# Patient Record
Sex: FEMALE | Race: WHITE | Employment: UNEMPLOYED | URBAN - METROPOLITAN AREA
[De-identification: names, ages, dates, MRNs, and addresses within clinical notes are randomized per-mention and may not be internally consistent; named-entity substitution may affect disease eponyms.]

---

## 2020-01-09 ENCOUNTER — APPOINTMENT (OUTPATIENT)
Dept: GENERAL RADIOLOGY | Age: 50
End: 2020-01-09
Attending: STUDENT IN AN ORGANIZED HEALTH CARE EDUCATION/TRAINING PROGRAM
Payer: MEDICARE

## 2020-01-09 ENCOUNTER — APPOINTMENT (OUTPATIENT)
Dept: CT IMAGING | Age: 50
End: 2020-01-09
Attending: STUDENT IN AN ORGANIZED HEALTH CARE EDUCATION/TRAINING PROGRAM
Payer: MEDICARE

## 2020-01-09 ENCOUNTER — HOSPITAL ENCOUNTER (EMERGENCY)
Age: 50
Discharge: HOME | End: 2020-01-09
Attending: STUDENT IN AN ORGANIZED HEALTH CARE EDUCATION/TRAINING PROGRAM
Payer: MEDICARE

## 2020-01-09 VITALS
HEART RATE: 80 BPM | OXYGEN SATURATION: 99 % | TEMPERATURE: 98 F | WEIGHT: 100.38 LBS | HEIGHT: 63.78 IN | SYSTOLIC BLOOD PRESSURE: 108 MMHG | BODY MASS INDEX: 17.35 KG/M2 | RESPIRATION RATE: 16 BRPM | DIASTOLIC BLOOD PRESSURE: 78 MMHG

## 2020-01-09 DIAGNOSIS — J44.1 COPD EXACERBATION (HCC): ICD-10-CM

## 2020-01-09 DIAGNOSIS — R91.8 LUNG NODULES: ICD-10-CM

## 2020-01-09 DIAGNOSIS — D64.9 ANEMIA, UNSPECIFIED TYPE: ICD-10-CM

## 2020-01-09 DIAGNOSIS — M79.602 PAIN OF LEFT UPPER EXTREMITY: Primary | ICD-10-CM

## 2020-01-09 DIAGNOSIS — Z87.19 H/O GASTROESOPHAGEAL REFLUX (GERD): ICD-10-CM

## 2020-01-09 DIAGNOSIS — J40 BRONCHITIS: ICD-10-CM

## 2020-01-09 LAB
ALBUMIN SERPL-MCNC: 3 G/DL (ref 3.4–5)
ALBUMIN/GLOB SERPL: 0.8 {RATIO} (ref 1–2)
ALP LIVER SERPL-CCNC: 77 U/L (ref 39–100)
ALT SERPL-CCNC: 11 U/L (ref 13–56)
ANION GAP SERPL CALC-SCNC: 7 MMOL/L (ref 0–18)
AST SERPL-CCNC: 22 U/L (ref 15–37)
ATRIAL RATE: 93 BPM
BASOPHILS # BLD AUTO: 0.06 X10(3) UL (ref 0–0.2)
BASOPHILS NFR BLD AUTO: 0.5 %
BILIRUB SERPL-MCNC: 0.3 MG/DL (ref 0.1–2)
BUN BLD-MCNC: 13 MG/DL (ref 7–18)
BUN/CREAT SERPL: 19.4 (ref 10–20)
CALCIUM BLD-MCNC: 8 MG/DL (ref 8.5–10.1)
CHLORIDE SERPL-SCNC: 111 MMOL/L (ref 98–112)
CO2 SERPL-SCNC: 27 MMOL/L (ref 21–32)
CREAT BLD-MCNC: 0.67 MG/DL (ref 0.55–1.02)
D-DIMER: 0.52 UG/ML FEU (ref ?–0.5)
DEPRECATED RDW RBC AUTO: 53.5 FL (ref 35.1–46.3)
EOSINOPHIL # BLD AUTO: 0.33 X10(3) UL (ref 0–0.7)
EOSINOPHIL NFR BLD AUTO: 2.7 %
ERYTHROCYTE [DISTWIDTH] IN BLOOD BY AUTOMATED COUNT: 16.6 % (ref 11–15)
GLOBULIN PLAS-MCNC: 3.9 G/DL (ref 2.8–4.4)
GLUCOSE BLD-MCNC: 102 MG/DL (ref 70–99)
GLUCOSE BLD-MCNC: 61 MG/DL (ref 70–99)
HCT VFR BLD AUTO: 33.8 % (ref 35–48)
HGB BLD-MCNC: 10.3 G/DL (ref 12–16)
IMM GRANULOCYTES # BLD AUTO: 0.02 X10(3) UL (ref 0–1)
IMM GRANULOCYTES NFR BLD: 0.2 %
LYMPHOCYTES # BLD AUTO: 6.71 X10(3) UL (ref 1–4)
LYMPHOCYTES NFR BLD AUTO: 55.7 %
M PROTEIN MFR SERPL ELPH: 6.9 G/DL (ref 6.4–8.2)
MCH RBC QN AUTO: 27.1 PG (ref 26–34)
MCHC RBC AUTO-ENTMCNC: 30.5 G/DL (ref 31–37)
MCV RBC AUTO: 88.9 FL (ref 80–100)
MONOCYTES # BLD AUTO: 0.81 X10(3) UL (ref 0.1–1)
MONOCYTES NFR BLD AUTO: 6.7 %
NEUTROPHILS # BLD AUTO: 4.12 X10 (3) UL (ref 1.5–7.7)
NEUTROPHILS # BLD AUTO: 4.12 X10(3) UL (ref 1.5–7.7)
NEUTROPHILS NFR BLD AUTO: 34.2 %
OSMOLALITY SERPL CALC.SUM OF ELEC: 298 MOSM/KG (ref 275–295)
P AXIS: 60 DEGREES
P-R INTERVAL: 160 MS
PLATELET # BLD AUTO: 302 10(3)UL (ref 150–450)
POTASSIUM SERPL-SCNC: 4.6 MMOL/L (ref 3.5–5.1)
Q-T INTERVAL: 348 MS
QRS DURATION: 82 MS
QTC CALCULATION (BEZET): 432 MS
R AXIS: 44 DEGREES
RBC # BLD AUTO: 3.8 X10(6)UL (ref 3.8–5.3)
SODIUM SERPL-SCNC: 145 MMOL/L (ref 136–145)
T AXIS: 59 DEGREES
TROPONIN I SERPL-MCNC: <0.045 NG/ML (ref ?–0.04)
VENTRICULAR RATE: 93 BPM
WBC # BLD AUTO: 12.1 X10(3) UL (ref 4–11)

## 2020-01-09 PROCEDURE — 71045 X-RAY EXAM CHEST 1 VIEW: CPT | Performed by: STUDENT IN AN ORGANIZED HEALTH CARE EDUCATION/TRAINING PROGRAM

## 2020-01-09 PROCEDURE — 85379 FIBRIN DEGRADATION QUANT: CPT | Performed by: STUDENT IN AN ORGANIZED HEALTH CARE EDUCATION/TRAINING PROGRAM

## 2020-01-09 PROCEDURE — 99285 EMERGENCY DEPT VISIT HI MDM: CPT

## 2020-01-09 PROCEDURE — 80053 COMPREHEN METABOLIC PANEL: CPT | Performed by: STUDENT IN AN ORGANIZED HEALTH CARE EDUCATION/TRAINING PROGRAM

## 2020-01-09 PROCEDURE — 93010 ELECTROCARDIOGRAM REPORT: CPT

## 2020-01-09 PROCEDURE — 96361 HYDRATE IV INFUSION ADD-ON: CPT

## 2020-01-09 PROCEDURE — 96360 HYDRATION IV INFUSION INIT: CPT

## 2020-01-09 PROCEDURE — 82962 GLUCOSE BLOOD TEST: CPT

## 2020-01-09 PROCEDURE — 94640 AIRWAY INHALATION TREATMENT: CPT

## 2020-01-09 PROCEDURE — 93005 ELECTROCARDIOGRAM TRACING: CPT

## 2020-01-09 PROCEDURE — 71275 CT ANGIOGRAPHY CHEST: CPT | Performed by: STUDENT IN AN ORGANIZED HEALTH CARE EDUCATION/TRAINING PROGRAM

## 2020-01-09 PROCEDURE — 85025 COMPLETE CBC W/AUTO DIFF WBC: CPT | Performed by: STUDENT IN AN ORGANIZED HEALTH CARE EDUCATION/TRAINING PROGRAM

## 2020-01-09 PROCEDURE — 84484 ASSAY OF TROPONIN QUANT: CPT | Performed by: STUDENT IN AN ORGANIZED HEALTH CARE EDUCATION/TRAINING PROGRAM

## 2020-01-09 RX ORDER — IPRATROPIUM BROMIDE AND ALBUTEROL SULFATE 2.5; .5 MG/3ML; MG/3ML
3 SOLUTION RESPIRATORY (INHALATION) ONCE
Status: COMPLETED | OUTPATIENT
Start: 2020-01-09 | End: 2020-01-09

## 2020-01-09 RX ORDER — MAGNESIUM HYDROXIDE/ALUMINUM HYDROXICE/SIMETHICONE 120; 1200; 1200 MG/30ML; MG/30ML; MG/30ML
30 SUSPENSION ORAL ONCE
Status: COMPLETED | OUTPATIENT
Start: 2020-01-09 | End: 2020-01-09

## 2020-01-09 RX ORDER — ALBUTEROL SULFATE 90 UG/1
2 AEROSOL, METERED RESPIRATORY (INHALATION) EVERY 4 HOURS PRN
Qty: 1 INHALER | Refills: 0 | Status: SHIPPED | OUTPATIENT
Start: 2020-01-09 | End: 2020-02-08

## 2020-01-09 RX ORDER — SODIUM CHLORIDE 9 MG/ML
INJECTION, SOLUTION INTRAVENOUS CONTINUOUS
Status: DISCONTINUED | OUTPATIENT
Start: 2020-01-09 | End: 2020-01-09

## 2020-01-09 RX ORDER — AZITHROMYCIN 250 MG/1
TABLET, FILM COATED ORAL
Qty: 1 PACKAGE | Refills: 0 | Status: SHIPPED | OUTPATIENT
Start: 2020-01-09 | End: 2020-01-14

## 2020-01-09 RX ORDER — ALBUTEROL SULFATE 2.5 MG/3ML
2.5 SOLUTION RESPIRATORY (INHALATION) EVERY 4 HOURS PRN
Qty: 30 AMPULE | Refills: 0 | Status: SHIPPED | OUTPATIENT
Start: 2020-01-09 | End: 2020-02-08

## 2020-01-09 RX ORDER — LIDOCAINE HYDROCHLORIDE 20 MG/ML
10 SOLUTION OROPHARYNGEAL ONCE
Status: COMPLETED | OUTPATIENT
Start: 2020-01-09 | End: 2020-01-09

## 2020-01-09 RX ORDER — ASPIRIN 81 MG/1
324 TABLET, CHEWABLE ORAL ONCE
Status: COMPLETED | OUTPATIENT
Start: 2020-01-09 | End: 2020-01-09

## 2020-01-09 RX ORDER — PREDNISONE 20 MG/1
40 TABLET ORAL DAILY
Qty: 10 TABLET | Refills: 0 | Status: SHIPPED | OUTPATIENT
Start: 2020-01-09 | End: 2020-01-14

## 2020-01-09 NOTE — ED PROVIDER NOTES
Patient Seen in: THE [facility] Emergency Department In [place]      History   Patient presents with:  Dyspnea BLAISE SOB    Stated Complaint: Left arm pain, difficulty breathing.     HPI    Patient is a 16-year-old female who reports previous history of lung dis 97.6 °F (36.4 °C) (Oral)   Resp 16   Ht 162 cm (5' 3.78\")   Wt 45.5 kg   SpO2 99%   BMI 17.35 kg/m²         Physical Exam    Constitutional: oriented to person, place, and time, appears frail and very thin. HENT:   Head: Normocephalic and atraumatic.    E females exclusively to validate the 95% negative predictive value  for venous thromboembolism when the D-Dimer is greater than 0.50 ug/mL (FEU). Proceed with caution from clinical presentation.      RBC MORPHOLOGY SCAN - Abnormal; Notable for the follow mediastinal pleura was noted. It is unclear whether this is related to previous history of patient's lymphoma versus current disease this is also associated with bilateral lower lobe pulmonary nodules.         MDM     Extensive differential diagnosis was c nodules    Disposition:  Discharge  1/9/2020  5:38 am    Follow-up:  Susu Oneal  [address] [facility] [place]  [address] [phone]    Schedule an appointment as soon as possible for a visit      your pcp    Today          Med

## 2021-04-02 PROBLEM — Z85.71 HISTORY OF HODGKIN'S DISEASE: Status: ACTIVE | Noted: 2021-04-02

## 2021-04-02 PROBLEM — Z85.72 HISTORY OF NON-HODGKIN'S LYMPHOMA: Status: ACTIVE | Noted: 2021-04-02

## 2021-05-20 ENCOUNTER — OFFICE VISIT (OUTPATIENT)
Dept: FAMILY MEDICINE CLINIC | Facility: CLINIC | Age: 51
End: 2021-05-20
Payer: MEDICAID

## 2021-05-20 VITALS
RESPIRATION RATE: 16 BRPM | HEART RATE: 88 BPM | HEIGHT: 61 IN | BODY MASS INDEX: 18.5 KG/M2 | TEMPERATURE: 98 F | DIASTOLIC BLOOD PRESSURE: 68 MMHG | WEIGHT: 98 LBS | SYSTOLIC BLOOD PRESSURE: 132 MMHG | OXYGEN SATURATION: 99 %

## 2021-05-20 DIAGNOSIS — Z00.00 WELL ADULT EXAM: Primary | ICD-10-CM

## 2021-05-20 DIAGNOSIS — Z12.31 VISIT FOR SCREENING MAMMOGRAM: ICD-10-CM

## 2021-05-20 DIAGNOSIS — F33.1 MODERATE EPISODE OF RECURRENT MAJOR DEPRESSIVE DISORDER (HCC): ICD-10-CM

## 2021-05-20 DIAGNOSIS — Z85.71 HISTORY OF HODGKIN'S DISEASE: ICD-10-CM

## 2021-05-20 DIAGNOSIS — E89.0 POSTOPERATIVE HYPOTHYROIDISM: ICD-10-CM

## 2021-05-20 DIAGNOSIS — F41.9 ANXIETY: ICD-10-CM

## 2021-05-20 DIAGNOSIS — E03.9 HYPOTHYROIDISM, UNSPECIFIED TYPE: ICD-10-CM

## 2021-05-20 DIAGNOSIS — J44.9 ASTHMA-COPD OVERLAP SYNDROME (HCC): ICD-10-CM

## 2021-05-20 DIAGNOSIS — G43.809 OTHER MIGRAINE WITHOUT STATUS MIGRAINOSUS, NOT INTRACTABLE: ICD-10-CM

## 2021-05-20 DIAGNOSIS — Z12.11 COLON CANCER SCREENING: ICD-10-CM

## 2021-05-20 DIAGNOSIS — G89.4 CHRONIC PAIN SYNDROME: ICD-10-CM

## 2021-05-20 DIAGNOSIS — Z90.81 H/O SPLENECTOMY: ICD-10-CM

## 2021-05-20 DIAGNOSIS — J44.9 CHRONIC OBSTRUCTIVE PULMONARY DISEASE, UNSPECIFIED COPD TYPE (HCC): ICD-10-CM

## 2021-05-20 DIAGNOSIS — M79.7 FIBROMYALGIA: ICD-10-CM

## 2021-05-20 DIAGNOSIS — C83.30 DIFFUSE LARGE B-CELL LYMPHOMA, UNSPECIFIED BODY REGION (HCC): ICD-10-CM

## 2021-05-20 DIAGNOSIS — K21.9 GASTROESOPHAGEAL REFLUX DISEASE WITHOUT ESOPHAGITIS: ICD-10-CM

## 2021-05-20 PROBLEM — F32.A DEPRESSION: Status: ACTIVE | Noted: 2018-05-21

## 2021-05-20 PROBLEM — G43.909 MIGRAINE: Status: ACTIVE | Noted: 2021-05-20

## 2021-05-20 PROBLEM — J18.9 PNEUMONIA OF RIGHT LOWER LOBE DUE TO INFECTIOUS ORGANISM: Status: ACTIVE | Noted: 2018-05-24

## 2021-05-20 PROCEDURE — 99386 PREV VISIT NEW AGE 40-64: CPT | Performed by: FAMILY MEDICINE

## 2021-05-20 PROCEDURE — 3078F DIAST BP <80 MM HG: CPT | Performed by: FAMILY MEDICINE

## 2021-05-20 PROCEDURE — 3075F SYST BP GE 130 - 139MM HG: CPT | Performed by: FAMILY MEDICINE

## 2021-05-20 PROCEDURE — 3008F BODY MASS INDEX DOCD: CPT | Performed by: FAMILY MEDICINE

## 2021-05-20 RX ORDER — MOMETASONE FUROATE AND FORMOTEROL FUMARATE DIHYDRATE 200; 5 UG/1; UG/1
1 AEROSOL RESPIRATORY (INHALATION) 2 TIMES DAILY
Qty: 1 INHALER | Refills: 5 | Status: SHIPPED | OUTPATIENT
Start: 2021-05-20

## 2021-05-20 RX ORDER — BUTALBITAL, ACETAMINOPHEN AND CAFFEINE 300; 40; 50 MG/1; MG/1; MG/1
1 CAPSULE ORAL 2 TIMES DAILY PRN
COMMUNITY
Start: 2021-04-28 | End: 2021-05-20

## 2021-05-20 RX ORDER — TIOTROPIUM BROMIDE 18 UG/1
18 CAPSULE ORAL; RESPIRATORY (INHALATION) DAILY
Qty: 30 CAPSULE | Refills: 11 | Status: SHIPPED | OUTPATIENT
Start: 2021-05-20

## 2021-05-20 RX ORDER — ONDANSETRON 8 MG/1
TABLET, ORALLY DISINTEGRATING ORAL
COMMUNITY
Start: 2021-05-18 | End: 2021-05-20

## 2021-05-20 RX ORDER — OXYCODONE HYDROCHLORIDE 30 MG/1
30 TABLET ORAL EVERY 6 HOURS PRN
COMMUNITY
Start: 2021-01-18 | End: 2021-05-20

## 2021-05-20 RX ORDER — SUMATRIPTAN 100 MG/1
100 TABLET, FILM COATED ORAL EVERY 2 HOUR PRN
Qty: 60 TABLET | Refills: 0 | Status: SHIPPED | OUTPATIENT
Start: 2021-05-20

## 2021-05-20 RX ORDER — LEVOTHYROXINE SODIUM 88 UG/1
88 TABLET ORAL EVERY MORNING
Qty: 90 TABLET | Refills: 1 | Status: SHIPPED | OUTPATIENT
Start: 2021-05-20 | End: 2021-06-08

## 2021-05-20 RX ORDER — ALPRAZOLAM 1 MG/1
1 TABLET ORAL 2 TIMES DAILY PRN
Qty: 60 TABLET | Refills: 0 | Status: SHIPPED | OUTPATIENT
Start: 2021-05-20 | End: 2021-06-08

## 2021-05-20 RX ORDER — PANTOPRAZOLE SODIUM 40 MG/1
40 TABLET, DELAYED RELEASE ORAL
Qty: 90 TABLET | Refills: 0 | Status: SHIPPED | OUTPATIENT
Start: 2021-05-20

## 2021-05-20 RX ORDER — NALOXONE HYDROCHLORIDE 4 MG/.1ML
4 SPRAY, METERED NASAL AS NEEDED
Qty: 1 KIT | Refills: 0 | Status: SHIPPED | OUTPATIENT
Start: 2021-05-20

## 2021-05-20 RX ORDER — MONTELUKAST SODIUM 10 MG/1
10 TABLET ORAL NIGHTLY
Qty: 90 TABLET | Refills: 0 | Status: SHIPPED | OUTPATIENT
Start: 2021-05-20

## 2021-05-20 RX ORDER — ONDANSETRON 8 MG/1
8 TABLET, ORALLY DISINTEGRATING ORAL EVERY 8 HOURS PRN
Qty: 60 TABLET | Refills: 0 | Status: SHIPPED | OUTPATIENT
Start: 2021-05-20

## 2021-05-20 RX ORDER — ESCITALOPRAM OXALATE 20 MG/1
20 TABLET ORAL DAILY
Qty: 90 TABLET | Refills: 3 | Status: SHIPPED | OUTPATIENT
Start: 2021-05-20 | End: 2022-05-15

## 2021-05-20 RX ORDER — BUTALBITAL, ACETAMINOPHEN AND CAFFEINE 300; 40; 50 MG/1; MG/1; MG/1
1 CAPSULE ORAL 2 TIMES DAILY PRN
Qty: 84 CAPSULE | Refills: 0 | Status: SHIPPED | OUTPATIENT
Start: 2021-05-20 | End: 2021-07-13

## 2021-05-20 RX ORDER — OXYCODONE HYDROCHLORIDE 30 MG/1
30 TABLET ORAL EVERY 6 HOURS PRN
Qty: 60 TABLET | Refills: 0 | Status: SHIPPED | OUTPATIENT
Start: 2021-05-20 | End: 2021-06-08

## 2021-05-20 RX ORDER — ALBUTEROL SULFATE 90 UG/1
2 AEROSOL, METERED RESPIRATORY (INHALATION) EVERY 4 HOURS PRN
Qty: 1 INHALER | Refills: 3 | Status: SHIPPED | OUTPATIENT
Start: 2021-05-20 | End: 2022-01-03

## 2021-05-20 NOTE — PATIENT INSTRUCTIONS
Prevention Guidelines, Women Ages 36 to 52  Screening tests and vaccines are an important part of managing your health. A screening test is done to find diseases in people who don't have any symptoms.  The goal is to find a disease early so lifestyle rosa sigmoidoscopy every 5 years, or  · Colonoscopy every 10 years, or  · CT colonography (virtual colonoscopy) every 5 years, or  · Yearly fecal occult blood test, or  · Yearly fecal immunochemical test every year, or  · Stool DNA test, every 3 years  If you c least 4 weeks after the first dose   Hepatitis A Women at increased risk for infection–talk with your healthcare provider 2 doses given 6 months apart   Hepatitis B Women at increased risk for infection–talk with your healthcare provider 3 doses over 6 mon American Academy of Ophthalmology  Lakesha last reviewed this educational content on 11/1/2017  © 4013-8223 [employer]. All rights reserved. This information is not intended as a substitute for professional medical care.  Always follow your

## 2021-05-20 NOTE — PROGRESS NOTES
Patient presents with:  Establish Care: discuss lexapro for depression- she states she thinks medication isnt working anymore.       HPI:  52 y/o F with hx of Hodgkin's lymphoma at age 6, then DLBCL in 2006, fibromyalgia, hypothyroid, COPD on oxygen, presen done.  Her last Pap smear was 2 years ago and it was normal.        Review of Systems   Constitutional: Negative for fever, chills. No distress.   She does report fatigue  HENT: Negative for hearing loss, congestion, sore throat, neck pain and dental proble Smokeless tobacco: Never Used    Alcohol use: Never    Drug use: Never      Current Outpatient Medications   Medication Sig Dispense Refill   • escitalopram (LEXAPRO) 20 MG Oral Tab Take 1 tablet (20 mg total) by mouth daily.  90 tablet 3   • OxyCODONE HCl Immunizations: There is no immunization history on file for this patient.       Physical Exam  /68   Pulse 88   Temp 98.2 °F (36.8 °C) (Temporal)   Resp 16   Ht 5' 1\" (1.549 m)   Wt 98 lb (44.5 kg)   SpO2 99%   BMI 18.52 kg/m²   Constitutional: Or exercise, counseled on safe sex, std risks, vaccine and screening guidelines. - CBC WITH DIFFERENTIAL WITH PLATELET; Future  - COMP METABOLIC PANEL (14); Future  - LIPID PANEL; Future  - HEMOGLOBIN A1C; Future  - OP REFERRAL TO [facility]    2.  History of foods, alcohol, mints and fatty foods. Pt should avoid eating or drinking anything for at least four hours before bed. [facility] should be elevated four to six inches. - Pantoprazole Sodium 40 MG Oral Tab EC;  Take 1 tablet (40 mg total) by mouth every ([address]  - Mometasone Furo-Formoterol Fum (DULERA) 200-5 MCG/ACT Inhalation Aerosol; Inhale 1 puff into the lungs 2 (two) times a day. Dispense: 1 Inhaler; Refill: 5  - Montelukast Sodium 10 MG Oral Tab; Take 1 tablet (10 mg total) by mouth nightly.   Lou Esters Sulfate  (90 Base) MCG/ACT Inhalation Aero Soln 1 Inhaler 3     Sig: Inhale 2 puffs into the lungs every 4 (four) hours as needed.    • Butalbital-APAP-Caffeine -40 MG Oral Cap 84 capsule 0     Sig: Take 1 capsule by mouth 2 (two) times daily a more additional risk factors for diabetes At least every 3 years1   Type 2 diabetes or prediabetes All women diagnosed with gestational diabetes Lifelong testing every 3 years   Type 2 diabetes All women with prediabetes Every year   Alcohol misuse All wom Sexually active women at increased risk for infection At routine exams   Hepatitis C Anyone at increased risk; 1 time for those born between [facility] At routine exams   High cholesterol or triglycerides All women ages 39 and older who are at risk for pneumococcal polysaccharide vaccine (PPSV23) Women at increased risk for infection–talk with your healthcare provider 1 or 2 doses   Tetanus/diphtheria/pertussis (Td/Tdap) booster All women in this age group A 1-time dose of Tdap instead of a Td booster af

## 2021-05-21 ENCOUNTER — OFFICE VISIT (OUTPATIENT)
Dept: PAIN CLINIC | Facility: CLINIC | Age: 51
End: 2021-05-21
Payer: MEDICAID

## 2021-05-21 VITALS
HEART RATE: 95 BPM | SYSTOLIC BLOOD PRESSURE: 132 MMHG | WEIGHT: 98 LBS | BODY MASS INDEX: 19 KG/M2 | OXYGEN SATURATION: 90 % | DIASTOLIC BLOOD PRESSURE: 66 MMHG

## 2021-05-21 DIAGNOSIS — R52 GENERALIZED PAIN: ICD-10-CM

## 2021-05-21 DIAGNOSIS — G89.4 CHRONIC PAIN SYNDROME: ICD-10-CM

## 2021-05-21 DIAGNOSIS — M79.7 FIBROMYALGIA: Primary | ICD-10-CM

## 2021-05-21 PROCEDURE — 3075F SYST BP GE 130 - 139MM HG: CPT | Performed by: NURSE PRACTITIONER

## 2021-05-21 PROCEDURE — 3078F DIAST BP <80 MM HG: CPT | Performed by: NURSE PRACTITIONER

## 2021-05-21 PROCEDURE — 99214 OFFICE O/P EST MOD 30 MIN: CPT | Performed by: NURSE PRACTITIONER

## 2021-05-21 NOTE — PROGRESS NOTES
Patient: Chano Felix  Medical Record Number: [id number]    Referring Physician:  April Joseph  PCP: same    Dear Dr. Carmen Dawson: Thank you very much for requesting this consultation.  I had the opportunity to evaluate and initiate care for your but has not had sleep study done.   Patient also uses xanax 1mg bid for anxiety/she states she uses this sparingly/she states that she is not sure why but recently she has had times where she gets very aggravated and overwhelmed with the amount of outside s Great-Grandfather         lung   • Breast Cancer Other       Social History    Socioeconomic History      Marital status: Unknown      Spouse name: Not on file      Number of children: Not on file      Years of education: Not on file      Highest education mouth every morning before breakfast. 90 tablet 0   • Naloxone HCl 4 MG/0.1ML Nasal Liquid 4 mg by Nasal route as needed. If patient remains unresponsive, repeat dose in other nostril 2-5 minutes after first dose.  1 kit 0   • famotidine 40 MG Oral Tab Take  08/13/2020    .0 01/09/2020         PHYSICAL EXAMIMATION   PHYSICAL EXAMINATION: Munir Rose is a 53year old  female who is observed sitting comfortably on a chair in the exam room alert and oriented times three.  She looks distal perfusion. Nails painted unable to assess for cyanosis  HEART: normal, regular, S1 and S2  LUNGS:CTA/diminished throughout  MUSCULOSKELETAL: Smooth, pain-free ROM to bilat hips, ankles, and knees.    Cspine:  + tenderness to bilat trapezius region  S engaged in the pain management process, or barriers prevent (insurance, transportation,  3)  Patient fully engaged in a spectrum of appropriate treatments but with inadequate response.    2 Psychological 1) Serious personality dysfunction or mental illness provider    MEDICAL DECISION MAKING:     Impression:   Patient seen today/not good candidate for long term opioids based on current diagnosis of fibromyalgia, however she has been on these for years now/and would require therapy and weaning.   Patient open

## 2021-05-21 NOTE — PROGRESS NOTES
HPI/Subjective:   Patient ID: Niki Quintana is a 53year old female.     HPI    History/Other:   Review of Systems  Current Outpatient Medications   Medication Sig Dispense Refill   • escitalopram (LEXAPRO) 20 MG Oral Tab Take 1 tablet (20 mg total) Comment:Breathing problems    Objective:   Physical Exam  Constitutional:              Assessment & Plan:   No diagnosis found. No orders of the defined types were placed in this encounter.       Meds This Visit:  Requested Prescriptions      No prescrip

## 2021-06-01 ENCOUNTER — MED REC SCAN ONLY (OUTPATIENT)
Dept: FAMILY MEDICINE CLINIC | Facility: CLINIC | Age: 51
End: 2021-06-01

## 2021-06-08 ENCOUNTER — TELEMEDICINE (OUTPATIENT)
Dept: FAMILY MEDICINE CLINIC | Facility: CLINIC | Age: 51
End: 2021-06-08

## 2021-06-08 DIAGNOSIS — M79.7 FIBROMYALGIA: ICD-10-CM

## 2021-06-08 DIAGNOSIS — F41.9 ANXIETY: ICD-10-CM

## 2021-06-08 DIAGNOSIS — E03.9 HYPOTHYROIDISM, UNSPECIFIED TYPE: ICD-10-CM

## 2021-06-08 DIAGNOSIS — J18.9 PNEUMONIA DUE TO INFECTIOUS ORGANISM, UNSPECIFIED LATERALITY, UNSPECIFIED PART OF LUNG: Primary | ICD-10-CM

## 2021-06-08 DIAGNOSIS — E89.0 POSTOPERATIVE HYPOTHYROIDISM: ICD-10-CM

## 2021-06-08 DIAGNOSIS — G89.4 CHRONIC PAIN SYNDROME: ICD-10-CM

## 2021-06-08 PROCEDURE — 99214 OFFICE O/P EST MOD 30 MIN: CPT | Performed by: FAMILY MEDICINE

## 2021-06-08 RX ORDER — LEVOTHYROXINE SODIUM 88 UG/1
88 TABLET ORAL EVERY MORNING
Qty: 90 TABLET | Refills: 1 | Status: SHIPPED | OUTPATIENT
Start: 2021-06-08

## 2021-06-08 RX ORDER — OXYCODONE HYDROCHLORIDE 30 MG/1
30 TABLET ORAL EVERY 6 HOURS PRN
Qty: 60 TABLET | Refills: 0 | Status: SHIPPED | OUTPATIENT
Start: 2021-06-08 | End: 2021-06-25

## 2021-06-08 RX ORDER — ALPRAZOLAM 1 MG/1
1 TABLET ORAL 2 TIMES DAILY PRN
Qty: 60 TABLET | Refills: 2 | Status: SHIPPED | OUTPATIENT
Start: 2021-06-08

## 2021-06-08 NOTE — PROGRESS NOTES
Subjective    This visit is conducted using Telemedicine with live, interactive video and audio. Chief Complaint:  Patient presents with:   Follow - Up        The patient confirmed knowledge of the limitations of the use of telemedicine were verbally c History   Problem Relation Age of Onset   • Cancer Maternal Great-Grandfather         lung   • Breast Cancer Other       Social History    Tobacco Use      Smoking status: Never Smoker      Smokeless tobacco: Never Used    Alcohol use: Never    Drug use: N MCG Oral Tab; Take 1 tablet (88 mcg total) by mouth every morning. Dispense: 90 tablet; Refill: 1    3. Anxiety  Discussed medication dosage, usage, side effects, and goals of treatment in detail.  - ALPRAZolam 1 MG Oral Tab;  Take 1 tablet (1 mg total) by Morena    Diagnostic rationale, follow up instructions, and strict precautions/indications for emergent direct evaluation were discussed with the patient.  The patient agrees with the plan, and understands to follow up with their primary care physician or o

## 2021-06-10 ENCOUNTER — MED REC SCAN ONLY (OUTPATIENT)
Dept: FAMILY MEDICINE CLINIC | Facility: CLINIC | Age: 51
End: 2021-06-10

## 2021-06-15 ENCOUNTER — TELEPHONE (OUTPATIENT)
Dept: FAMILY MEDICINE CLINIC | Facility: CLINIC | Age: 51
End: 2021-06-15

## 2021-06-15 NOTE — TELEPHONE ENCOUNTER
Pt was in [facility]. She would like to have a different kind of cough medication because the ones that she has been taking have not been working. Pt was discharged about 4-5 days ago.  Patient states that she has finished the prednisone, antibioti

## 2021-06-15 NOTE — TELEPHONE ENCOUNTER
Future Appointments   Date Time Provider [facility]   6/16/2021 10:30 AM Eliza Conway DO [facility] [address] [facility]   6/21/2021 11:20 AM Rosina Handy MD [facility]   6/22/2021  2:00 PM Sherita Graves, LCSW [facility] [place]

## 2021-06-16 ENCOUNTER — TELEMEDICINE (OUTPATIENT)
Dept: FAMILY MEDICINE CLINIC | Facility: CLINIC | Age: 51
End: 2021-06-16

## 2021-06-16 ENCOUNTER — TELEPHONE (OUTPATIENT)
Dept: FAMILY MEDICINE CLINIC | Facility: CLINIC | Age: 51
End: 2021-06-16

## 2021-06-16 DIAGNOSIS — R05.9 COUGH: Primary | ICD-10-CM

## 2021-06-16 PROCEDURE — 99213 OFFICE O/P EST LOW 20 MIN: CPT | Performed by: FAMILY MEDICINE

## 2021-06-16 RX ORDER — PROMETHAZINE HYDROCHLORIDE AND CODEINE PHOSPHATE 6.25; 1 MG/5ML; MG/5ML
5 SOLUTION ORAL 2 TIMES DAILY PRN
Qty: 118 ML | Refills: 0 | Status: SHIPPED | OUTPATIENT
Start: 2021-06-16 | End: 2021-06-26

## 2021-06-16 NOTE — TELEPHONE ENCOUNTER
Alprazolam 1mg BID needs a PA.     PA submitted through [facility], awaiting approval/denial.

## 2021-06-16 NOTE — PROGRESS NOTES
Subjective    This visit is conducted using Telemedicine with live, interactive video and audio.     Chief Complaint:  Patient presents with:  Cough        The patient confirmed knowledge of the limitations of the use of telemedicine were verbally confirm COVID-19 QUESTIONS - quick screening.    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 no  Pt has Risk for complications no  Documented fever:  no  Travel to high risk [place]

## 2021-06-17 NOTE — PATIENT INSTRUCTIONS
COPD: Chronic Coughing  When you have COPD, you may have a cough that lasts for 3 months or longer. It can last for 2 years in a row. This is called a chronic cough. A chronic cough with COPD is usually caused by irritation of the airways.    What is a ch harmful irritants you breathe in. This helps protect the airways. But too much mucus can block the airways. And that makes it harder to breathe. The body tries to remove this excess mucus by coughing it up.    Treatment for chronic cough  There is no cure f pneumonia. Ask your healthcare provider about the flu and pneumonia vaccines. Take steps to prevent colds and other lung infections. · Practice correct handwashing. Wash your hands often with soap and water.  Use hand  when you can’t wash your ha

## 2021-06-23 DIAGNOSIS — G89.4 CHRONIC PAIN SYNDROME: ICD-10-CM

## 2021-06-23 DIAGNOSIS — M79.7 FIBROMYALGIA: ICD-10-CM

## 2021-06-23 NOTE — TELEPHONE ENCOUNTER
Requesting Oxycodone 30mg  LOV: 6/16/21 VV  RTC: prn  Last Relevant Labs:   Filled: 6/8/21 #60 with 0 refills    Future Appointments   Date Time Provider Gina Trim[place]   8/2/2021 10:00 AM Sheryl Kayser F, [facility] [place]     Per [place] PM

## 2021-06-23 NOTE — TELEPHONE ENCOUNTER
Pt would like to have a prescription for her pain medication sent to local pharmacy. She was only given a 15 day supply.

## 2021-06-24 ENCOUNTER — TELEPHONE (OUTPATIENT)
Dept: FAMILY MEDICINE CLINIC | Facility: CLINIC | Age: 51
End: 2021-06-24

## 2021-06-24 ENCOUNTER — HOSPITAL ENCOUNTER (OUTPATIENT)
Dept: GENERAL RADIOLOGY | Facility: HOSPITAL | Age: 51
Discharge: HOME | End: 2021-06-24
Attending: FAMILY MEDICINE
Payer: MEDICAID

## 2021-06-24 DIAGNOSIS — Z85.71 HISTORY OF HODGKIN'S DISEASE: ICD-10-CM

## 2021-06-24 DIAGNOSIS — M79.7 FIBROMYALGIA: ICD-10-CM

## 2021-06-24 DIAGNOSIS — Z00.00 WELL ADULT EXAM: ICD-10-CM

## 2021-06-24 PROCEDURE — 80061 LIPID PANEL: CPT

## 2021-06-24 PROCEDURE — 83036 HEMOGLOBIN GLYCOSYLATED A1C: CPT

## 2021-06-24 PROCEDURE — 84443 ASSAY THYROID STIM HORMONE: CPT

## 2021-06-24 PROCEDURE — 36591 DRAW BLOOD OFF VENOUS DEVICE: CPT

## 2021-06-24 PROCEDURE — 80053 COMPREHEN METABOLIC PANEL: CPT

## 2021-06-24 PROCEDURE — 84439 ASSAY OF FREE THYROXINE: CPT

## 2021-06-24 PROCEDURE — 85025 COMPLETE CBC W/AUTO DIFF WBC: CPT

## 2021-06-24 PROCEDURE — 82306 VITAMIN D 25 HYDROXY: CPT

## 2021-06-24 NOTE — TELEPHONE ENCOUNTER
Spoke to pt, informed her that her messages have been received and forwarded to Dr. Marti Littlejohn. Advised pt that Dr. Marti Littlejohn is out of the office and should be back tomorrow.  Also reviewed refill policy with pt, advised refills can take up to 48 hrs for appr

## 2021-06-25 ENCOUNTER — TELEPHONE (OUTPATIENT)
Dept: FAMILY MEDICINE CLINIC | Facility: CLINIC | Age: 51
End: 2021-06-25

## 2021-06-25 DIAGNOSIS — G89.4 CHRONIC PAIN SYNDROME: ICD-10-CM

## 2021-06-25 DIAGNOSIS — M79.7 FIBROMYALGIA: ICD-10-CM

## 2021-06-25 RX ORDER — OXYCODONE HYDROCHLORIDE 30 MG/1
30 TABLET ORAL DAILY PRN
Qty: 60 TABLET | Refills: 0 | Status: SHIPPED | OUTPATIENT
Start: 2021-06-25 | End: 2021-07-10

## 2021-06-25 RX ORDER — OXYCODONE HYDROCHLORIDE 30 MG/1
30 TABLET ORAL EVERY 6 HOURS PRN
Qty: 60 TABLET | Refills: 0 | Status: SHIPPED | OUTPATIENT
Start: 2021-06-25 | End: 2021-07-10

## 2021-06-25 NOTE — TELEPHONE ENCOUNTER
- Pt is calling back to follow up on medication request.  Patient has been out of medication for two days and is in a lot of pain.     Patient will schedule an appointment next week after seeing the lung specialist.    Please advise Ph [phone]

## 2021-06-25 NOTE — TELEPHONE ENCOUNTER
Spoke with pt, informed new Rx sent to pharmacy with original dosing instructions. Pt verbalized understanding and agreement. All questions answered.

## 2021-06-25 NOTE — TELEPHONE ENCOUNTER
Patient states it's an emergency to get her Oxy, she states she has been leaving messages for 4 days, wants to talk to the doctor, they changed something with the medication, she's having a hard time getting it, wants a call back asap! Please advise.

## 2021-06-25 NOTE — TELEPHONE ENCOUNTER
Spoke to pt, states Rx for Oxycodone that was sent today was for 1 tablet daily as needed. Pt states she takes 4 tablets per day, the pharmacy won't dispense Rx because directions changed.  Prescriptions in the past have been 1 tablet every 6 hours as neede

## 2021-06-28 ENCOUNTER — TELEPHONE (OUTPATIENT)
Dept: PAIN CLINIC | Facility: CLINIC | Age: 51
End: 2021-06-28

## 2021-06-28 DIAGNOSIS — E55.9 VITAMIN D DEFICIENCY: Primary | ICD-10-CM

## 2021-06-28 NOTE — TELEPHONE ENCOUNTER
Contacted patient to discuss Vit D results of 15.7 with her.   She reports she was recently in hospital x 10 days for PNA/was dx with severe copd/on inhalers/has f/u with pulm    She will be prescribed 50,000 units vit d weekly x 6 weeks and then f/u for re

## 2021-07-10 ENCOUNTER — OFFICE VISIT (OUTPATIENT)
Dept: FAMILY MEDICINE CLINIC | Facility: CLINIC | Age: 51
End: 2021-07-10
Payer: MEDICAID

## 2021-07-10 VITALS
HEIGHT: 61 IN | RESPIRATION RATE: 16 BRPM | SYSTOLIC BLOOD PRESSURE: 136 MMHG | DIASTOLIC BLOOD PRESSURE: 80 MMHG | WEIGHT: 97 LBS | HEART RATE: 105 BPM | OXYGEN SATURATION: 99 % | TEMPERATURE: 98 F | BODY MASS INDEX: 18.31 KG/M2

## 2021-07-10 DIAGNOSIS — Z12.11 COLON CANCER SCREENING: ICD-10-CM

## 2021-07-10 DIAGNOSIS — F11.20 OPIOID DEPENDENCE WITH CURRENT USE (HCC): ICD-10-CM

## 2021-07-10 DIAGNOSIS — Z12.31 VISIT FOR SCREENING MAMMOGRAM: ICD-10-CM

## 2021-07-10 DIAGNOSIS — M79.7 FIBROMYALGIA: ICD-10-CM

## 2021-07-10 DIAGNOSIS — Z12.4 PAPANICOLAOU SMEAR: ICD-10-CM

## 2021-07-10 DIAGNOSIS — G89.4 CHRONIC PAIN SYNDROME: Primary | ICD-10-CM

## 2021-07-10 PROCEDURE — 99214 OFFICE O/P EST MOD 30 MIN: CPT | Performed by: FAMILY MEDICINE

## 2021-07-10 PROCEDURE — 3079F DIAST BP 80-89 MM HG: CPT | Performed by: FAMILY MEDICINE

## 2021-07-10 PROCEDURE — 3075F SYST BP GE 130 - 139MM HG: CPT | Performed by: FAMILY MEDICINE

## 2021-07-10 PROCEDURE — 3008F BODY MASS INDEX DOCD: CPT | Performed by: FAMILY MEDICINE

## 2021-07-10 RX ORDER — OXYCODONE HYDROCHLORIDE 15 MG/1
15 TABLET ORAL EVERY 6 HOURS PRN
Qty: 120 TABLET | Refills: 0 | Status: SHIPPED | OUTPATIENT
Start: 2021-07-10 | End: 2021-07-10

## 2021-07-10 RX ORDER — ONDANSETRON HYDROCHLORIDE 8 MG/1
8 TABLET, FILM COATED ORAL EVERY 12 HOURS PRN
Qty: 30 TABLET | Refills: 0 | Status: SHIPPED | OUTPATIENT
Start: 2021-07-10 | End: 2021-08-04

## 2021-07-10 RX ORDER — OXYCODONE HYDROCHLORIDE 30 MG/1
30 TABLET ORAL EVERY 6 HOURS PRN
Qty: 120 TABLET | Refills: 0 | Status: SHIPPED | OUTPATIENT
Start: 2021-07-10 | End: 2021-07-10

## 2021-07-10 NOTE — PROGRESS NOTES
Patient presents with:  Complete Form: patient brought form to complete so she can be released to drive  Medication Follow-Up: medication review      HPI:     54 y/o F with hx of Hodgkin's lymphoma at age 6, then DLBCL in 2006, fibromyalgia, hypothyroid, C that the 15 mg are not covering and only 30s are. When I spoke with the pharmacist she reports that oxycodone is not covered at all and patient has been using good Rx cards to obtain it. She has basically been paying out-of-pocket for it this whole time. HERNIA SURGERY     • OTHER SURGICAL HISTORY      ovaries removal   • REMOVAL GALLBLADDER     • REMOVAL SPLEEN, TOTAL     • THYROIDECTOMY       Family History   Problem Relation Age of Onset   • Cancer Maternal Great-Grandfather         lung   • Breast Canc Monohydrate (SPIRIVA HANDIHALER) 18 MCG Inhalation Cap Inhale 1 capsule (18 mcg total) into the lungs daily.  30 capsule 11   • Pantoprazole Sodium 40 MG Oral Tab EC Take 1 tablet (40 mg total) by mouth every morning before breakfast. 90 tablet 0   • Naloxo it will be covered at a higher dose and quantity. I personally do not feel comfortable prescribing her opioids anymore as I do think that she has opiate addiction. Refer to addiction specialist for this.   Also declined her clearance form for permission t breast cancer by 40% to 50%. Due to the importance of this annual life saving examination, we encourage you to call us and schedule an appointment at your earliest convenience.   We want to make it easy and convenient for you to have your mammogram.    Ovidio

## 2021-07-10 NOTE — PATIENT INSTRUCTIONS
According to our records, you are overdue for your annual mammography screening. Mammograms are the best method to detect breast cancer early when it is easier to treat and before it is big enough to feel or cause symptoms.     Getting an annual lisa
(0) independent

## 2021-07-11 DIAGNOSIS — G43.809 OTHER MIGRAINE WITHOUT STATUS MIGRAINOSUS, NOT INTRACTABLE: ICD-10-CM

## 2021-07-12 ENCOUNTER — TELEPHONE (OUTPATIENT)
Dept: FAMILY MEDICINE CLINIC | Facility: CLINIC | Age: 51
End: 2021-07-12

## 2021-07-12 DIAGNOSIS — G43.809 OTHER MIGRAINE WITHOUT STATUS MIGRAINOSUS, NOT INTRACTABLE: ICD-10-CM

## 2021-07-12 NOTE — TELEPHONE ENCOUNTER
Name from pharmacy: BUT/ACETAMINOPHEN CAFF -40 CP          Will file in chart as: BUTALBITAL-APAP-CAFFEINE -43 MG Oral Cap     Possible duplicate: [place] to review recent actions on this medication    Sig: TAKE 1 CAPSULE BY MOUTH TWICE DAILY AS

## 2021-07-13 RX ORDER — BUTALBITAL, ACETAMINOPHEN AND CAFFEINE 300; 40; 50 MG/1; MG/1; MG/1
CAPSULE ORAL
Qty: 84 CAPSULE | Refills: 0 | OUTPATIENT
Start: 2021-07-13

## 2021-07-13 RX ORDER — BUTALBITAL, ACETAMINOPHEN AND CAFFEINE 300; 40; 50 MG/1; MG/1; MG/1
CAPSULE ORAL
Qty: 84 CAPSULE | Refills: 0 | Status: SHIPPED | OUTPATIENT
Start: 2021-07-13

## 2021-07-15 ENCOUNTER — TELEPHONE (OUTPATIENT)
Dept: FAMILY MEDICINE CLINIC | Facility: CLINIC | Age: 51
End: 2021-07-15

## 2021-07-15 NOTE — TELEPHONE ENCOUNTER
Referred to addiction clinic and will not accept her insurance, pls advise        Can we find her addiction medicine in network.

## 2021-07-15 NOTE — TELEPHONE ENCOUNTER
I called and spoke to patient about the referral for addiction medicine. I explained that [facility] stated she would need to call the number on the back of her insurance card [phone]. They will work with her to find a doctor or clinic.   Patient raised her

## 2021-07-16 ENCOUNTER — LAB ENCOUNTER (OUTPATIENT)
Dept: LAB | Age: 51
End: 2021-07-16
Attending: NURSE PRACTITIONER
Payer: MEDICAID

## 2021-07-16 DIAGNOSIS — F11.90 CHRONIC, CONTINUOUS USE OF OPIOIDS: ICD-10-CM

## 2021-07-16 PROBLEM — R52 GENERALIZED PAIN: Status: ACTIVE | Noted: 2021-07-16

## 2021-07-16 PROBLEM — E55.9 VITAMIN D DEFICIENCY: Status: ACTIVE | Noted: 2021-07-16

## 2021-07-16 PROCEDURE — 80307 DRUG TEST PRSMV CHEM ANLYZR: CPT

## 2021-07-16 NOTE — PROGRESS NOTES
Patient presents in office today with reported pain in \"all through my whole body\"    Current pain level reported = 8/10    Last reported dose of Oxycodone yesterday, pt took 800mg ibuprofen this morning       Narcotic Contract renewal     Urine Drug scr

## 2021-07-16 NOTE — PROGRESS NOTES
HPI:   Gerard Durham presents for follow up/she was last seen in office for initial consultation on 5/21/21 for fibromyalgia, pain in skull base of neck/traps, low back, FA, bilat hands, down legs to the tops of her feet.   She had had this denis sinc  having so many parking tickets, and she had a hearing and they are asking for this form to be filled out stating that patient is medically fit to operate a motor vehicle.      Patient states that she Is taking her meds as prescribed, oycodone 15mg q Tab Take 1 tablet (1 mg total) by mouth 2 (two) times daily as needed. 60 tablet 2   • escitalopram (LEXAPRO) 20 MG Oral Tab Take 1 tablet (20 mg total) by mouth daily.  90 tablet 3   • Mometasone Furo-Formoterol Fum (DULERA) 200-5 MCG/ACT Inhalation Aeroso effect, normal concentration & attention span  Inspection:   Non antalgic  No gross motor/sensory deficits noted  Well coord movement  Slightly pressured speech  No s/s intox  Radiology/Lab Test Reviewed:   Labs in system reviewed vit D: 15.7  No new imagi determine medical stability to drive. At this point, I would not be able to fill the form out/due to liability and risk while prescribing these medications.      Plan:   >opioid agreement signed today  >uds today   >see in office next month to evaluate pr

## 2021-07-19 LAB
6-ACETYLMORHINE CUTOFF 20 NG/ML: NOT DETECTED
7-AMINOCLONAZEPAM 40 NG/ML: NOT DETECTED
A-OH-ALPRAZOLM CUTOFF 20 NG/ML: NOT DETECTED
ALPHA-OH-MIDAZOLAM (CUTOFF 20 NG/ML): NOT DETECTED
ALPRAZOLAM CUTOFF 40 NG/ML: NOT DETECTED
AMPHETAMINE CUTOFF 10 NG/ML: NOT DETECTED
BARBITURATES CUTOFF 200 NG/ML: PRESENT
BENZOYLECGONINE  150 NG/ML: NOT DETECTED
BUPRENORPHINE CUTOFF 5 NG/ML: NOT DETECTED
CARISOPRODOL CUTOFF 100 NG/ML: NOT DETECTED
CODINE CUTOFF 40 NG/ML: NOT DETECTED
COLNAZEPAM CUTOFF 20 NG/ML: NOT DETECTED
CREATININE,URINE: 170.1 MG/DL
DIAZEPAM CUTOFF 50 NG/ML: NOT DETECTED
ETHYL-GLUCURONIDE 500 NG/ML: NOT DETECTED
FENTANYL CUTOFF 2 NG/ML: NOT DETECTED
GABAPENTIN (CUTOFF 100 NG/ML): NOT DETECTED
HYDROCODONE CUTOFF 40 NG/ML: NOT DETECTED
HYDROMORPHONE CUTOFF 40 NG/ML: NOT DETECTED
LORAZEPAM CUTOFF 60 NG/ML: NOT DETECTED
MARIJUANA CUTOFF 20 NG/ML: NOT DETECTED
MDA CUTOFF 200 NG/ML: NOT DETECTED
MDEA EVE CUTOFF 200 NG/ML: NOT DETECTED
MDMA-ECSTASY CUTOFF 200 NG/ML: NOT DETECTED
MEPERIDINE MET CUTOFF 50 NG/ML: NOT DETECTED
METHADONE CUTOFF 150 NG/ML: NOT DETECTED
METHAMPHETMN CUTOFF 400 NG/ML: NOT DETECTED
METHYLPHENIDATE (CUTOFF 100 NG/ML): NOT DETECTED
MIDAZOLAM CUTOFF 20 NG/ML: NOT DETECTED
MORHINE CUTOFF 20 NG/ML: NOT DETECTED
NALOXONE (CUTOFF 100 NG/ML): NOT DETECTED
NORBUPRENORPHINE  20 NG/ML: NOT DETECTED
NORDIAZEPAM CUTOFF 50 NG/ML: NOT DETECTED
NORFENTANYL CUTOFF 2 NG/ML: NOT DETECTED
NORHYDRCODONE CUTOFF 100 NG/ML: NOT DETECTED
NOROXYCODONE CUTOFF 100 NG/ML: PRESENT
NOROXYMORPHNE CUTOFF 100 NG/ML: PRESENT
OXAZEPAM CUTOFF 50 NG/ML: NOT DETECTED
OXYCODONE CUTOFF 40 NG/ML: PRESENT
OXYMORPHONE CUTOFF 40 NG/ML: PRESENT
PCP CUTOFF 25 NG/ML: NOT DETECTED
PHENTERMINE CUTOFF 100 NG/ML: NOT DETECTED
PREGABALIN (CUTOFF 100 NG/ML): NOT DETECTED
TAPENTADOL CUTOFF 100 NG/ML: NOT DETECTED
TAPENTADOL-O SULF 200 NG/ML: NOT DETECTED
TEMAZEPAM CUTOFF 50 NG/ML: NOT DETECTED
TRAMADOL CUTOFF 200 NG/ML: NOT DETECTED
ZOLPIDEM CUTOFF 20 NG/ML: NOT DETECTED
ZOLPIDEM METABOLITE (CUTOFF 100 NG/ML): NOT DETECTED

## 2021-07-20 ENCOUNTER — MED REC SCAN ONLY (OUTPATIENT)
Dept: PAIN CLINIC | Facility: CLINIC | Age: 51
End: 2021-07-20

## 2021-07-20 NOTE — TELEPHONE ENCOUNTER
Pt calling stating she met with Moni Villanueva on Friday 07/16/2021. Pt stated that there was a document left with [place]  regarding the pt's license and is looking for confirmation that the document has been completed.  Pt stated once the document is

## 2021-07-20 NOTE — TELEPHONE ENCOUNTER
NASRIN Willis  You 23 minutes ago (10:20 AM)     Patient was told that I was not going to fill out paperwork stating that she was medically stable to operate motor vehicle.  If she wants this to be done, she would have to go to [employer] and

## 2021-07-21 NOTE — TELEPHONE ENCOUNTER
D/w David Sotomayor RN who provided detailed information pertaining to [facility]. Noted that this was indicated in [facility] notes, therefore placed referral.     Per LOV note:  \"Also d/w patient that I was not comfortable filling out medi

## 2021-07-21 NOTE — TELEPHONE ENCOUNTER
Spoke with patient and explained [place] Adelso ALBARADO is not comfortable signing paperwork without a Drivers Evaluation . Patient started to raise her voice at this point and writer politely asked her to lower her voice.  Reiterated to patient again that VERENA will

## 2021-07-21 NOTE — TELEPHONE ENCOUNTER
Spoke to pt to relay info below. Pt states she was not informed of this info, she was told that [facility] would discuss the paperwork with another provider and get back to her.  Pt states she will do what needs to be done, but must have this done as immanuel

## 2021-07-21 NOTE — TELEPHONE ENCOUNTER
Pt calling to state that she called both [facility] locations, [facility] and [facility] regarding the [employer]. Pt stated that both facilities are not providing this service due to staffing issues, and the [facility] location told t

## 2021-07-21 NOTE — TELEPHONE ENCOUNTER
Frandy Jimenez called stating she followed up with the patient to schedule and but patient  refused to schedule due to the total cost of the program. Per Frandy Jimenez initial eval costs $300 and behind the wheel would be an additional $300.  Patient informed Frandy Jimenez

## 2021-07-21 NOTE — TELEPHONE ENCOUNTER
Researched OT  evaluation and found \"[facility] [place]. \" Contacted facility to ensure that they are active and providing this svc, which was confirmed. Inquired about what is needed to refer pt.  Informed that this facility is staffed by the O

## 2021-07-29 NOTE — TELEPHONE ENCOUNTER
Received paperwork addressed to [facility] from [facility] regarding the request for therapy services: occupational therapy drivers eval & treat. Document is asking for doc's signature. Placed in RN bin for review.

## 2021-07-29 NOTE — TELEPHONE ENCOUNTER
Per msg below, pt refused eval and treatment. Fax sent back to [facility] asking that they disregard order. Confirmation rcv'd.

## 2021-08-03 DIAGNOSIS — F11.20 OPIOID DEPENDENCE WITH CURRENT USE (HCC): ICD-10-CM

## 2021-08-04 NOTE — TELEPHONE ENCOUNTER
Requested Prescriptions     Pending Prescriptions Disp Refills   • ondansetron (ZOFRAN) 8 MG tablet [Pharmacy Med Name: ONDANSETRON 8MG TABLETS] 30 tablet 0     Sig: TAKE 1 TABLET(8 MG) BY MOUTH EVERY 12 HOURS AS NEEDED FOR NAUSEA     LOV: 7/10/21  RTC:

## 2021-08-06 RX ORDER — ONDANSETRON HYDROCHLORIDE 8 MG/1
TABLET, FILM COATED ORAL
Qty: 30 TABLET | Refills: 1 | Status: SHIPPED | OUTPATIENT
Start: 2021-08-06 | End: 2021-10-21

## 2021-08-14 DIAGNOSIS — J44.9 ASTHMA-COPD OVERLAP SYNDROME (HCC): ICD-10-CM

## 2021-08-14 DIAGNOSIS — J44.9 CHRONIC OBSTRUCTIVE PULMONARY DISEASE, UNSPECIFIED COPD TYPE (HCC): ICD-10-CM

## 2021-08-14 DIAGNOSIS — K21.9 GASTROESOPHAGEAL REFLUX DISEASE WITHOUT ESOPHAGITIS: ICD-10-CM

## 2021-08-16 NOTE — TELEPHONE ENCOUNTER
Name from pharmacy: PANTOPRAZOLE 40MG TABLETS          Will file in chart as: PANTOPRAZOLE 40 MG Oral Tab EC    Sig: TAKE 1 TABLET(40 MG) BY MOUTH EVERY MORNING BEFORE BREAKFAST    Disp:  90 tablet    Refills:  0 (Pharmacy requested: Not specified)    Star

## 2021-08-22 ENCOUNTER — HOSPITAL ENCOUNTER (EMERGENCY)
Age: 51
Discharge: HOME | End: 2021-08-22
Attending: EMERGENCY MEDICINE
Payer: MEDICAID

## 2021-08-22 VITALS
SYSTOLIC BLOOD PRESSURE: 114 MMHG | HEART RATE: 86 BPM | OXYGEN SATURATION: 100 % | HEIGHT: 61 IN | RESPIRATION RATE: 16 BRPM | BODY MASS INDEX: 18.88 KG/M2 | TEMPERATURE: 98 F | DIASTOLIC BLOOD PRESSURE: 69 MMHG | WEIGHT: 100 LBS

## 2021-08-22 DIAGNOSIS — Z20.822 EXPOSURE TO CONFIRMED CASE OF COVID-19: Primary | ICD-10-CM

## 2021-08-22 LAB — SARS-COV-2 RNA RESP QL NAA+PROBE: NOT DETECTED

## 2021-08-22 PROCEDURE — 99283 EMERGENCY DEPT VISIT LOW MDM: CPT | Performed by: EMERGENCY MEDICINE

## 2021-08-22 NOTE — ED PROVIDER NOTES
Patient Seen in: [facility] Emergency Department In [place]      History   Patient presents with:  Testing    Stated Complaint: son who lives with her tested positive for covid yesterday.   requesting to be t*    HPI/Subjective:   HPI    16-year-old woman Decreased air exchangeExtremities: no edema, normal peripheral pulses   Neuro: Alert oriented and nonfocal   Skin: no rashes or nodules         ED Course     Labs Reviewed   RAPID SARS-COV-2 BY PCR        MDM      Patient informed that she is a candidate f No

## 2021-08-24 LAB — AMB EXT COVID-19 RESULT: NOT DETECTED

## 2021-08-31 ENCOUNTER — HOSPITAL ENCOUNTER (EMERGENCY)
Age: 51
Discharge: HOME | End: 2021-08-31
Payer: MEDICAID

## 2021-08-31 ENCOUNTER — APPOINTMENT (OUTPATIENT)
Dept: ULTRASOUND IMAGING | Age: 51
End: 2021-08-31
Payer: MEDICAID

## 2021-08-31 VITALS
BODY MASS INDEX: 18.88 KG/M2 | WEIGHT: 100 LBS | HEIGHT: 61 IN | HEART RATE: 67 BPM | OXYGEN SATURATION: 100 % | TEMPERATURE: 98 F | SYSTOLIC BLOOD PRESSURE: 131 MMHG | RESPIRATION RATE: 14 BRPM | DIASTOLIC BLOOD PRESSURE: 63 MMHG

## 2021-08-31 DIAGNOSIS — M79.662 BILATERAL CALF PAIN: Primary | ICD-10-CM

## 2021-08-31 DIAGNOSIS — M79.661 BILATERAL CALF PAIN: Primary | ICD-10-CM

## 2021-08-31 PROCEDURE — 99284 EMERGENCY DEPT VISIT MOD MDM: CPT

## 2021-08-31 PROCEDURE — 93970 EXTREMITY STUDY: CPT

## 2021-08-31 RX ORDER — PANTOPRAZOLE SODIUM 40 MG/1
TABLET, DELAYED RELEASE ORAL
Qty: 90 TABLET | Refills: 0 | OUTPATIENT
Start: 2021-08-31

## 2021-08-31 RX ORDER — MONTELUKAST SODIUM 10 MG/1
TABLET ORAL
Qty: 90 TABLET | Refills: 0 | OUTPATIENT
Start: 2021-08-31

## 2021-08-31 NOTE — ED PROVIDER NOTES
Patient Seen in: THE [facility] Emergency Department In [place]      History   Patient presents with:  Swelling Edema    Stated Complaint: noticed right foot and calf swelling within the last couple hours denies injury    HPI/Subjective:   HPI    67-year-old Psychiatric/Behavioral: Negative for suicidal ideas. All other systems reviewed and are negative. Positive for stated complaint: noticed right foot and calf swelling within the last couple hours denies injury  Other systems are as noted in HPI.   C for what she thought was swelling of the legs, she was concerned about DVT as she had blood clots before years ago when she had cancer. Denied any injuries. Denied any heat/erythema. Patient felt her legs were swollen, this happened a couple hours ago.

## 2021-09-11 DIAGNOSIS — G43.809 OTHER MIGRAINE WITHOUT STATUS MIGRAINOSUS, NOT INTRACTABLE: ICD-10-CM

## 2021-09-13 RX ORDER — BUTALBITAL, ACETAMINOPHEN AND CAFFEINE 300; 40; 50 MG/1; MG/1; MG/1
CAPSULE ORAL
Qty: 84 CAPSULE | Refills: 0 | OUTPATIENT
Start: 2021-09-13

## 2021-09-13 NOTE — TELEPHONE ENCOUNTER
Requesting Butabital  LOV: 7/10/21  RTC: prn  Last Relevant Labs: 6/24/21  Filled: 7/13/21 #84 with 0 refills    No future appointments.     Pt has new PCP, Rx denied

## 2021-09-28 ENCOUNTER — APPOINTMENT (OUTPATIENT)
Dept: GENERAL RADIOLOGY | Age: 51
DRG: 193 | End: 2021-09-28
Attending: EMERGENCY MEDICINE
Payer: MEDICAID

## 2021-09-28 ENCOUNTER — HOSPITAL ENCOUNTER (INPATIENT)
Facility: HOSPITAL | Age: 51
LOS: 5 days | Discharge: HOME | DRG: 193 | End: 2021-10-04
Attending: EMERGENCY MEDICINE | Admitting: HOSPITALIST
Payer: MEDICAID

## 2021-09-28 DIAGNOSIS — J18.9 PNEUMONIA DUE TO INFECTIOUS ORGANISM, UNSPECIFIED LATERALITY, UNSPECIFIED PART OF LUNG: ICD-10-CM

## 2021-09-28 DIAGNOSIS — R91.8 HILAR MASS: Primary | ICD-10-CM

## 2021-09-28 DIAGNOSIS — D72.829 LEUKOCYTOSIS, UNSPECIFIED TYPE: ICD-10-CM

## 2021-09-28 PROBLEM — D64.9 ANEMIA: Status: ACTIVE | Noted: 2021-09-28

## 2021-09-28 PROCEDURE — 71046 X-RAY EXAM CHEST 2 VIEWS: CPT | Performed by: EMERGENCY MEDICINE

## 2021-09-28 RX ORDER — PIPERACILLIN SODIUM, TAZOBACTAM SODIUM 3; .375 G/15ML; G/15ML
INJECTION, POWDER, LYOPHILIZED, FOR SOLUTION INTRAVENOUS
Status: DISPENSED
Start: 2021-09-28 | End: 2021-09-29

## 2021-09-28 RX ORDER — DEXTROSE 5 % IN WATER
PIGGYBACK WITH THREADED PORT (ML) INTRAVENOUS
Status: DISPENSED
Start: 2021-09-28 | End: 2021-09-29

## 2021-09-29 ENCOUNTER — APPOINTMENT (OUTPATIENT)
Dept: CT IMAGING | Facility: HOSPITAL | Age: 51
DRG: 193 | End: 2021-09-29
Attending: HOSPITALIST
Payer: MEDICAID

## 2021-09-29 PROBLEM — J18.9 PNEUMONIA DUE TO INFECTIOUS ORGANISM, UNSPECIFIED LATERALITY, UNSPECIFIED PART OF LUNG: Status: ACTIVE | Noted: 2021-09-29

## 2021-09-29 PROBLEM — D72.829 LEUKOCYTOSIS, UNSPECIFIED TYPE: Status: ACTIVE | Noted: 2021-09-29

## 2021-09-29 PROCEDURE — 99223 1ST HOSP IP/OBS HIGH 75: CPT | Performed by: HOSPITALIST

## 2021-09-29 PROCEDURE — 71260 CT THORAX DX C+: CPT | Performed by: HOSPITALIST

## 2021-09-29 RX ORDER — ONDANSETRON 2 MG/ML
4 INJECTION INTRAMUSCULAR; INTRAVENOUS EVERY 6 HOURS PRN
Status: DISCONTINUED | OUTPATIENT
Start: 2021-09-29 | End: 2021-10-04

## 2021-09-29 RX ORDER — ACETAMINOPHEN 500 MG
1000 TABLET ORAL ONCE
Status: DISCONTINUED | OUTPATIENT
Start: 2021-09-29 | End: 2021-10-04

## 2021-09-29 RX ORDER — ACETAMINOPHEN 325 MG/1
650 TABLET ORAL EVERY 6 HOURS PRN
Status: DISCONTINUED | OUTPATIENT
Start: 2021-09-29 | End: 2021-10-04

## 2021-09-29 RX ORDER — ENOXAPARIN SODIUM 100 MG/ML
40 INJECTION SUBCUTANEOUS DAILY
Status: DISCONTINUED | OUTPATIENT
Start: 2021-09-29 | End: 2021-10-04

## 2021-09-29 RX ORDER — PROCHLORPERAZINE EDISYLATE 5 MG/ML
5 INJECTION INTRAMUSCULAR; INTRAVENOUS EVERY 8 HOURS PRN
Status: DISCONTINUED | OUTPATIENT
Start: 2021-09-29 | End: 2021-10-04

## 2021-09-29 RX ORDER — IBUPROFEN 600 MG/1
600 TABLET ORAL EVERY 4 HOURS PRN
Status: DISCONTINUED | OUTPATIENT
Start: 2021-09-29 | End: 2021-10-04

## 2021-09-29 RX ORDER — IBUPROFEN 400 MG/1
400 TABLET ORAL EVERY 4 HOURS PRN
Status: DISCONTINUED | OUTPATIENT
Start: 2021-09-29 | End: 2021-10-04

## 2021-09-29 RX ORDER — MELATONIN
3 NIGHTLY PRN
Status: DISCONTINUED | OUTPATIENT
Start: 2021-09-29 | End: 2021-10-04

## 2021-09-29 RX ORDER — DEXTROSE AND SODIUM CHLORIDE 5; .45 G/100ML; G/100ML
INJECTION, SOLUTION INTRAVENOUS CONTINUOUS
Status: ACTIVE | OUTPATIENT
Start: 2021-09-29 | End: 2021-09-29

## 2021-09-29 RX ORDER — PANTOPRAZOLE SODIUM 40 MG/1
40 TABLET, DELAYED RELEASE ORAL
Status: DISCONTINUED | OUTPATIENT
Start: 2021-09-30 | End: 2021-10-04

## 2021-09-29 RX ORDER — IBUPROFEN 200 MG
200 TABLET ORAL EVERY 4 HOURS PRN
Status: DISCONTINUED | OUTPATIENT
Start: 2021-09-29 | End: 2021-10-04

## 2021-09-29 RX ORDER — LEVOTHYROXINE SODIUM 88 UG/1
88 TABLET ORAL
Status: DISCONTINUED | OUTPATIENT
Start: 2021-09-29 | End: 2021-10-04

## 2021-09-29 RX ORDER — SODIUM CHLORIDE 30 MG/ML INHALATION SOLUTION 30 MG/ML
4 SOLUTION INHALANT 2 TIMES DAILY
Status: DISCONTINUED | OUTPATIENT
Start: 2021-09-29 | End: 2021-10-04

## 2021-09-29 RX ORDER — SUMATRIPTAN 50 MG/1
100 TABLET, FILM COATED ORAL EVERY 2 HOUR PRN
Status: DISCONTINUED | OUTPATIENT
Start: 2021-09-29 | End: 2021-10-04

## 2021-09-29 RX ORDER — OXYCODONE HYDROCHLORIDE 10 MG/1
30 TABLET ORAL EVERY 6 HOURS PRN
Status: DISCONTINUED | OUTPATIENT
Start: 2021-09-29 | End: 2021-10-04

## 2021-09-29 RX ORDER — ALPRAZOLAM 1 MG/1
1 TABLET ORAL 2 TIMES DAILY PRN
Status: DISCONTINUED | OUTPATIENT
Start: 2021-09-29 | End: 2021-10-04

## 2021-09-29 RX ORDER — MORPHINE SULFATE 4 MG/ML
4 INJECTION, SOLUTION INTRAMUSCULAR; INTRAVENOUS ONCE
Status: COMPLETED | OUTPATIENT
Start: 2021-09-29 | End: 2021-09-29

## 2021-09-29 RX ORDER — MONTELUKAST SODIUM 10 MG/1
10 TABLET ORAL NIGHTLY
Status: DISCONTINUED | OUTPATIENT
Start: 2021-09-29 | End: 2021-10-04

## 2021-09-29 RX ORDER — ESCITALOPRAM OXALATE 10 MG/1
20 TABLET ORAL DAILY
Status: DISCONTINUED | OUTPATIENT
Start: 2021-09-29 | End: 2021-10-04

## 2021-09-29 NOTE — ED INITIAL ASSESSMENT (HPI)
PT to the ED for evaluation of pain in her flank and back. PT states, \"I know I have pneumonia again. \"

## 2021-09-29 NOTE — H&P
[facility] HOSPITALIST  History and Physical     Jacques Bar Gurrola Patient Status:  Inpatient    1970 MRN [id number]   [facility] 4NW-A Attending Fredis Edmonds [address] Day # 0 PCP Leon Dorado MD     Chief Complaint: D Comment:Breathing problems    Medications:  No current facility-administered medications on file prior to encounter.   Montelukast Sodium 10 MG Oral Tab, Take 1 tablet (10 mg total) by mouth nightly., Disp: 90 tablet, Rfl: 0  Albuterol Sulfate  (90 B 8 (eight) hours as needed for Nausea., Disp: 60 tablet, Rfl: 0  Pantoprazole Sodium 40 MG Oral Tab EC, Take 1 tablet (40 mg total) by mouth every morning before breakfast., Disp: 90 tablet, Rfl: 0  Naloxone HCl 4 MG/0.1ML Nasal Liquid, 4 mg by Nasal route Results    COVID-19  Lab Results   Component Value Date    COVID19 Not Detected 09/28/2021    COVID19 Not Detected 08/24/2021    COVID19 Not Detected 08/22/2021       Pro-Calcitonin  No results for input(s): PCT in the last 168 hours.     Cardiac  No result

## 2021-09-29 NOTE — ED QUICK NOTES
PT updated on delays. Given pillow and adjusted HOB. Lights dimmed for comfort and PT brought crackers and juice.

## 2021-09-29 NOTE — PROGRESS NOTES
NURSING ADMISSION NOTE      Patient admitted via Ambulance from [facility]. Oriented to room, patient still c/o back pain, but noticed dozing off while  Interviewing to complete admission navigation. Safety precautions initiated. Bed alarm on.   Be

## 2021-09-29 NOTE — PAYOR COMM NOTE
--------------  ADMISSION REVIEW     Payor: Tyrese Levy #:  [id number]  Authorization Number: [id number]    Admit date: 9/29/21  Admit time: 10:23 AM       History   HPI  This is a 44-year-old female, who presents All other components within normal limits   MANUAL DIFFERENTIAL - Abnormal; Notable for the following components:    Neutrophil Absolute Manual 21.34 (*)     Monocyte Absolute Manual 2.01 (*)     All other components within normal limits   CBC W/ DIFFER recommendations. Physical Exam:    /70 (BP Location: Right arm)   Pulse 100   Temp 98.7 °F (37.1 °C) (Oral)   Resp 21   Ht 5' 2\" (1.575 m)   Wt 92 lb 4.8 oz (41.9 kg)   SpO2 97%   BMI 16.88 kg/m²     HEENT: Normocephalic atraumatic.  Moist mucous Date Action Dose Route     9/29/2021 0738 Given  Intravenous       oxyCODONE immediate release tab 30 mg     Date Action Dose Route     9/29/2021 1342 Given  Oral       Piperacillin Sod-Tazobactam So (ZOSYN) 3.375 g in dextrose 5% 100 mL IVPB-ADDV     Date

## 2021-09-29 NOTE — CONSULTS
Pulmonary H&P/Consult     NAME: French Lesch Dejohnette - ROOM: 202/[facility] - MRN: [id number] - Age: 53year old - :  1970    Date of Admission: 2021  9:03 PM  Admission Diagnosis: Hilar mass [R91.8]  Leukocytosis, unspecified type [D72.829]  Pneum OTHER SURGICAL HISTORY      ovaries removal   • REMOVAL GALLBLADDER     • REMOVAL SPLEEN, TOTAL     • REPAIR ING HERNIA,5+Y/O,REDUCIBL     • THYROIDECTOMY       Family History   Problem Relation Age of Onset   • Cancer Maternal Great-Grandfather         yuli 18.5   NEPRELIM 21.00*   WBC 25.1*   .0*     Recent Labs   Lab 09/28/21  2202   GLU 73   BUN 17   CREATSERUM 0.54*   GFRAA 128   GFRNAA 111   CA 8.6   ALB 2.2*   *   K 3.3*   CL 97*   CO2 27.0   ALKPHO 116*   AST 15   ALT 12*   BILT 0.4   TP 6

## 2021-09-29 NOTE — PROGRESS NOTES
120 [facility] Dosing Service    Initial Pharmacokinetic Consult for Vancomycin AUC Dosing    Idania Rivera is a 53year old patient who is being treated for pneumonia.   Pharmacy has been asked to dose vancomycin by Dr. Simeon Lai.    Weights:  Female pat

## 2021-09-29 NOTE — ED PROVIDER NOTES
Patient Seen in: THE [facility] Emergency Department In [place]      History   Patient presents with:  Abdomen/Flank Pain    Stated Complaint: pt c/o flank pain for 5-6 days. hx copd.     Subjective:   HPI    This is a 43-year-old female, who presents with cou except as noted above.     Physical Exam     ED Triage Vitals [09/28/21 2119]   BP (!) 164/75   Pulse 112   Resp 26   Temp 99.8 °F (37.7 °C)   Temp src Temporal   SpO2 100 %   O2 Device Nasal cannula       Current:/76   Pulse 109   Temp 99.8 °F (37.7 ---------                               -----------         ------                     CBC W/ DIFFERENTIAL[[id number]]          Abnormal            Final result                 Please view results for these tests on the individual orders. DOPPLER LEG BILAT - DIAG IMG (CPT=93970)  COMPARISON:  None. INDICATIONS:  eval for DVT  TECHNIQUE:  Real time, grey scale, and duplex ultrasound was used to evaluate the lower extremity venous system.  B-mode two-dimensional images of the vascular structu Impression:  Hilar mass  (primary encounter diagnosis)  Pneumonia due to infectious organism, unspecified laterality, unspecified part of lung  Leukocytosis, unspecified type     Disposition:  Admit  9/28/2021 11:03 pm    Follow-up:  No follow-up provider

## 2021-09-30 PROCEDURE — 99232 SBSQ HOSP IP/OBS MODERATE 35: CPT | Performed by: HOSPITALIST

## 2021-09-30 RX ORDER — POTASSIUM CHLORIDE 14.9 MG/ML
20 INJECTION INTRAVENOUS ONCE
Status: COMPLETED | OUTPATIENT
Start: 2021-09-30 | End: 2021-09-30

## 2021-09-30 NOTE — PROGRESS NOTES
Pulmonary Progress Note      NAME: Sai Gurrola - ROOM: [address] [place] [address] - MRN: [id number] - Age: 53year old - : 1970    Assessment/Plan:  1. Asthma/COPD overlap  -bronchodilator protocol  - Continue oxygen  -continue Incruse  2.  Bronchiectasis GI: Soft, NTND, +normoactive BS   Ext: No cyanosis, clubbing or edema     Recent Labs   Lab 09/28/21 2202 09/28/21 2202 09/30/21  0516   RBC 3.13*   < > 3.07*   HGB 8.4*   < > 8.0*   HCT 26.8*   < > 26.4*   MCV 85.6   < > 86.0   MCH 26.8   < > 26.1   M

## 2021-09-30 NOTE — CONSULTS
[facility]  Report of Consultation    Sai Gurrola Patient Status:  Inpatient    1970 MRN [id number]   [facility] 4NW-A Attending [facility] Day # 1 PCP Josefina Gonzalez MD     ADMIT DATE AND TIME: 9 Medications:   •  potassium chloride 40 mEq in sodium chloride 0.9% 250 mL IVPB, 40 mEq, Intravenous, Once **FOLLOWED BY** potassium chloride IVPB premix 20 mEq, 20 mEq, Intravenous, Once  •  acetaminophen (TYLENOL EXTRA STRENGTH) tab 1,000 mg, 1,000 mg, O temperature 98.9 °F (37.2 °C), temperature source Oral, resp. rate 18, height 1.575 m (5' 2\"), weight 41.9 kg (92 lb 4.8 oz), SpO2 98 %. Performance Status:    General: Patient is alert and oriented x 3, not in acute distress; lean built.    HEENT: No I American 129 >=60    GFR, -American 149 >=60    AST 14 (L) 15 - 37 U/L    ALT 9 (L) 13 - 56 U/L    Alkaline Phosphatase 101 (H) 39 - 100 U/L    Bilirubin, Total 0.3 0.1 - 2.0 mg/dL    Total Protein 5.6 (L) 6.4 - 8.2 g/dL    Albumin 1.7 (L) 3.4 - 5.0 9/30/2021 at 1:14 AM     Finalized by (CST): Rocío Banuelos MD on 9/30/2021 at 1:27 AM           PROBLEM LIST:     Patient Active Problem List:     History of non-Hodgkin's lymphoma     History of Hodgkin's disease     Depression     H/O splenectomy     Pne

## 2021-09-30 NOTE — DIETARY MALNUTRITION NOTE
[facility]    NUTRITION ASSESSMENT    Pt meets severe malnutrition criteria.     CRITERIA FOR MALNUTRITION DIAGNOSIS:  Criteria for severe malnutrition diagnosis: chronic illness related to energy intake less than 75% for greater than 1 month, body fat lb)     NUTRITION:  Diet: Orders Placed This Encounter      Regular/General diet Is Patient on Accuchecks?  No     Oral Supplements: declined    Percent Meals Eaten (last 3 days)     Date/Time Percent Meals Eaten (%)    09/30/21 1000 70 %        FOOD/NUTRIT

## 2021-09-30 NOTE — PLAN OF CARE
Pt received alert and oriented x4. Vital signs stable. Reports of generalized pain, relief with PRN oxy. IV abx infusing per MAR. K+ replaced per protocol. Ambulatory to bathroom. Pt on 3-4L O2 via NC at baseline. Call light within reach.  Will continue to

## 2021-09-30 NOTE — PROGRESS NOTES
[facility]     Hospitalist Progress Note     Mendoza Gurrola Patient Status:  Inpatient    1970 MRN [id number]   [facility] 4NW-A Attending Breanne Osuna [facility] Day # 1 PCP Susana Pastor MD     Chief Complaint CRP, GUSTABO, LDH, DDIMER in the last 168 hours. Imaging: Imaging data reviewed in Epic.     Medications:   • potassium chloride  20 mEq Intravenous Once   • acetaminophen  1,000 mg Oral Once   • escitalopram  20 mg Oral Daily   • levothyroxine  88 mcg Oral

## 2021-09-30 NOTE — PLAN OF CARE
Problem: SAFETY ADULT - FALL  Goal: Free from fall injury  Description: INTERVENTIONS:  - Assess pt frequently for physical needs  - Identify cognitive and physical deficits and behaviors that affect risk of falls.   - [place] fall precautions as indica

## 2021-10-01 PROCEDURE — 99232 SBSQ HOSP IP/OBS MODERATE 35: CPT | Performed by: HOSPITALIST

## 2021-10-01 NOTE — PROGRESS NOTES
[facility] PULMONARY/CRITICAL CARE    S: No new events overnight.     Meds:  • acetaminophen  1,000 mg Oral Once   • escitalopram  20 mg Oral Daily   • levothyroxine  88 mcg Oral QAM AC   • montelukast  10 mg Oral Nightly   • pantoprazole  40 mg Oral QAM AC   • Um 6*  --   --    CREATSERUM 0.54* 0.34*  0.34*  --  0.34*   GFRAA 128 149  149  --  149   GFRNAA 111 129  129  --  129   CA 8.6 8.3*  --   --    ALB 2.2* 1.7*  --   --    * 137  --   --    K 3.3* 3.0* 3.8  --    CL 97* 100  --   --    CO2 27.0 33.0*  -

## 2021-10-01 NOTE — PROGRESS NOTES
120 [facility] Dosing Service    Follow-up Pharmacokinetic Consult for Vancomycin AUC Dosing     Florentino Hollis is a 53year old patient who is being treated for pneumonia.   Patient is on day 3 of vancomycin and is currently receiving 750 mg IV Q 12 ho

## 2021-10-01 NOTE — PROGRESS NOTES
Pt is alert and oriented and has 4 liters nasal cannula.   Pt has portacath and has complained of pain this am.

## 2021-10-01 NOTE — PROGRESS NOTES
[facility]  Progress Note    Sai Gurrola Patient Status:  Inpatient    1970 MRN [id number]   [facility]A [facility] Day [facility] 2 PCP Josefina Gonzalez MD     ADMISSION DATE AND TIME:  puff Inhalation Daily   • enoxaparin  40 mg Subcutaneous Daily   • piperacillin-tazobactam  3.375 g Intravenous Q8H   • sodium chloride (hypertonic)  4 mL Nebulization BID         ASSESSMENT AND PLAN:     Holly Rosenthal is a 53year old female with h

## 2021-10-01 NOTE — CM/SW NOTE
10/01/21 1100   CM/SW Referral Data   Referral Source Social Work (self-referral)   Reason for Referral Discharge planning   Informant Patient   Patient Info   Patient's Current Mental Status at Time of Assessment Alert; Oriented   Patient's Home Enviro

## 2021-10-01 NOTE — PROGRESS NOTES
[facility]     Hospitalist Progress Note     Meliton Gurrola Patient Status:  Inpatient    1970 MRN [id number]   [facility] 4NW-A Attending Rodney Quiroz[address] Day # 2 PCP Jhonny Jeffries MD     Chief Complaint hours. Cardiac  No results for input(s): TROP, PBNP in the last 168 hours. Creatinine Kinase  No results for input(s): CK in the last 168 hours. Inflammatory Markers  No results for input(s): CRP, GUSTABO, LDH, DDIMER in the last 168 hours.     Imaging

## 2021-10-02 PROCEDURE — 99232 SBSQ HOSP IP/OBS MODERATE 35: CPT | Performed by: HOSPITALIST

## 2021-10-02 NOTE — HOME CARE LIAISON
Patient provided with list of [facility] providers from [facility], financial interest disclosure provided to patient. Patient is declining HH at this time. Patient feels she has the support at home as she lives with her son. [place] updated.

## 2021-10-02 NOTE — PROGRESS NOTES
[facility]     Hospitalist Progress Note     Shayne Sogonzalezsukhjaja Patient Status:  Inpatient    1970 MRN [id number]   [facility] 4NW-A Attending [facility] P[facility] Jorge Birmingham MD     Chief Complaint Lab Results    COVID-19  Lab Results   Component Value Date    COVID19 Not Detected 09/28/2021    COVID19 Not Detected 08/24/2021    COVID19 Not Detected 08/22/2021       Pro-Calcitonin  No results for input(s): PCT in the last 168 hours.     Cardiac  No re

## 2021-10-02 NOTE — PLAN OF CARE
Pt alert and oriented x4, vitals stable, c/o pain prn pain med given per [facility] with good relief. Orders for blood cx drawn from port and peripheral. On 4L of oxygen nasal cannula. Continued IV abx, will monitor.

## 2021-10-02 NOTE — CM/SW NOTE
SW spoke with [facility] liaison. Pt is declining home health at this time. Pt has ongoing support through her son at home.

## 2021-10-02 NOTE — PROGRESS NOTES
[facility] PULMON[facility]    S: No new events overnight.     Meds:  • vancomycin  15 mg/kg Intravenous Q8H   • acetaminophen  1,000 mg Oral Once   • escitalopram  20 mg Oral Daily   • levothyroxine  88 mcg Oral QAM AC   • montelukast  10 mg Oral Nightly 0.54*   < > 0.34*  0.34*  --  0.34* 0.33*   GFRAA 128   < > 149  149  --  149 151   GFRNAA 111   < > 129  129  --  129 131   CA 8.6  --  8.3*  --   --   --    ALB 2.2*  --  1.7*  --   --   --    *  --  137  --   --   --    K 3.3*  --  3.0* 3.8  --

## 2021-10-02 NOTE — PLAN OF CARE
Pt is aaox4, denies pain, on iv abtx, on 4l/nc, denies SOB.   Problem: Patient/Family Goals  Goal: Patient/Family Long Term Goal  Description: Patient's Long Term Goal:     Interventions:  -   - See additional Care Plan goals for specific interventions  Out INTERVENTIONS:  - Assess for changes in respiratory status  - Assess for changes in mentation and behavior  - Position to facilitate oxygenation and minimize respiratory effort  - Oxygen supplementation based on oxygen saturation or ABGs  - Provide Smoking

## 2021-10-03 ENCOUNTER — APPOINTMENT (OUTPATIENT)
Dept: GENERAL RADIOLOGY | Facility: HOSPITAL | Age: 51
DRG: 193 | End: 2021-10-03
Attending: HOSPITALIST
Payer: MEDICAID

## 2021-10-03 PROCEDURE — 99232 SBSQ HOSP IP/OBS MODERATE 35: CPT | Performed by: HOSPITALIST

## 2021-10-03 PROCEDURE — 71045 X-RAY EXAM CHEST 1 VIEW: CPT | Performed by: HOSPITALIST

## 2021-10-03 NOTE — CONSULTS
120 [facility] Dosing Service    Follow-up Pharmacokinetic Consult for Vancomycin AUC Dosing     Aury Wright is a 53year old patient who is being treated for pneumonia.   Patient is on day 4 of vancomycin and is currently receiving 750 mg IV Q 8 geri

## 2021-10-03 NOTE — PROGRESS NOTES
[facility]     Hospitalist Progress Note     Sharmainechuck Newton Galileo Patient Status:  Inpatient    1970 MRN [id number]   [facility] 4NW-A Attending Nura Saab [address] Day # 4 PCP Syeda Rubio MD     Chief Complaint --   --    TP 6.5  --  5.6*  --   --   --   --   --     < > = values in this interval not displayed. Estimated Creatinine Clearance: 97.9 mL/min (A) (based on SCr of 0.46 mg/dL (L)). No results for input(s): PTP, INR in the last 168 hours. dependent on: Progress  At this point Ms. Gurrola is expected to be discharge to: Home

## 2021-10-03 NOTE — PROGRESS NOTES
[facility]UL[facility]    S: No new events overnight. Feels no different than prior.   Having trouble coughing up mucous    Meds:  • vancomycin  15 mg/kg Intravenous Q8H   • acetaminophen  1,000 mg Oral Once   • escitalopram  20 mg Oral Daily   • levot 09/30/21  0516 09/30/21  0516 09/30/21  1752 10/01/21  0548 10/02/21  0638 10/03/21  0653   GLU 73  --  96  --   --   --   --   --    BUN 17  --  6*  --   --   --   --   --    CREATSERUM 0.54*   < > 0.34*  0.34*   < >  --  0.34* 0.33* 0.46*   GFRAA 128   < chest with consolidation/mass in the RUL which is new but may be infectious  - Repeat CT in 4-6 weeks after therapy  6. Hx of lymphoma  -Onc following  7. Dispo -   - will follow      Joe Ramos M.D.   Pulmonary/Critical Care and Sleep Medicine

## 2021-10-03 NOTE — CONSULTS
120 [facility] Dosing Service    Follow-up Pharmacokinetic Consult for Vancomycin AUC Dosing     Holly Rosenthal is a 53year old patient who is being treated for pneumonia.   Patient is on day 4 of vancomycin and is currently receiving 750 mg IV Q 8 geri

## 2021-10-03 NOTE — PLAN OF CARE
Pt is aaox4, complains of pleuritic pain, sore throat, medicated with oxy as needed, pt complains of fatigue and spoke with dr. Oliva Carrel, crx done, on IV abtx.   Problem: Patient/Family Goals  Goal: Patient/Family Long Term Goal  Description: Patient's Rajwinder Owens Problem: RESPIRATORY - ADULT  Goal: Achieves optimal ventilation and oxygenation  Description: INTERVENTIONS:  - Assess for changes in respiratory status  - Assess for changes in mentation and behavior  - Position to facilitate oxygenation and minimize r

## 2021-10-04 VITALS
HEIGHT: 62 IN | HEART RATE: 103 BPM | BODY MASS INDEX: 16.99 KG/M2 | SYSTOLIC BLOOD PRESSURE: 115 MMHG | WEIGHT: 92.31 LBS | DIASTOLIC BLOOD PRESSURE: 67 MMHG | RESPIRATION RATE: 16 BRPM | TEMPERATURE: 98 F | OXYGEN SATURATION: 99 %

## 2021-10-04 PROCEDURE — 99239 HOSP IP/OBS DSCHRG MGMT >30: CPT | Performed by: HOSPITALIST

## 2021-10-04 RX ORDER — AMOXICILLIN AND CLAVULANATE POTASSIUM 875; 125 MG/1; MG/1
1 TABLET, FILM COATED ORAL 2 TIMES DAILY
Qty: 8 TABLET | Refills: 0 | Status: SHIPPED | OUTPATIENT
Start: 2021-10-04 | End: 2021-10-08

## 2021-10-04 NOTE — PLAN OF CARE
Pt. A&O x4. VSS. Afebrile. Pt. Remains on 4L O2 via NC; this is her baseline. No c/o SOB. Pt. C/o pain; PRN pain medication given per [facility] with relief. Pt. Able to ambulate independently in the room. IV antibiotics continued. Call light within reach.  Will c Problem: RESPIRATORY - ADULT  Goal: Achieves optimal ventilation and oxygenation  Description: INTERVENTIONS:  - Assess for changes in respiratory status  - Assess for changes in mentation and behavior  - Position to facilitate oxygenation and minimize r

## 2021-10-04 NOTE — PAYOR COMM NOTE
--------------  CONTINUED STAY REVIEW    Payor: Tyrese Levy #:  [id number]  Authorization Number: [id number]    Admit date: 9/29/21  Admit time: 10:23 AM    FAXING CLINICAL UPDATE FOR 10/1/21- 10/3/21    10/1/21 0.34*  0.34* 0.34* 0.33*   GFRAA 128 149  149 149 151   GFRNAA 111 129  129 129 131     Assessment & Plan:       · Pneumonia with possible GN organism:  Clinically improved, on baseline O2, change to po abx when ok with pulmonary.   Blood culture positive f gram-negative organisms  -CT consistent with increased lower lobe infiltrates  -continue vancomycin and Zosyn, day #5  -CBC much improved today; continue present abx  5.  Possible right hilar mass  -CT chest with consolidation/mass in the RUL which is new b

## 2021-10-04 NOTE — PROGRESS NOTES
[facility]     Hospitalist Progress Note     Shayne Gurrola Patient Status:  Inpatient    1970 MRN [id number]   [facility] 4NW-A Attending [facility] Day [facility] PCP Jorge Birmingham MD     Chief Complaint --   --   --     < > = values in this interval not displayed. Estimated Creatinine Clearance: 132.4 mL/min (A) (based on SCr of 0.34 mg/dL (L)). No results for input(s): PTP, INR in the last 168 hours.          COVID-19 Lab Results    COVID-19  Lab expected to be discharge to: Home

## 2021-10-04 NOTE — PROGRESS NOTES
Pt is alert and oriented and receiving antibiotics as ordered. Lungs diminished and bowel sounds present but hypoactive. Pt has no comlaints of pain at this time.

## 2021-10-04 NOTE — DIETARY MALNUTRITION NOTE
[facility]    NUTRITION ASSESSMENT    Pt meets severe malnutrition criteria.     CRITERIA FOR MALNUTRITION DIAGNOSIS:  Criteria for severe malnutrition diagnosis: chronic illness related to energy intake less than 75% for greater than 1 month, body fat kg (97 lb)  06/25/21 : 44.5 kg (98 lb)  05/21/21 : 44.5 kg (98 lb)  05/20/21 : 44.5 kg (98 lb)  04/02/21 : 46.7 kg (103 lb)  03/04/21 : 48.1 kg (106 lb)     NUTRITION:  Diet: Orders Placed This Encounter      Regular/General diet Is Patient on Accuchecks?

## 2021-10-04 NOTE — PROGRESS NOTES
Pulmonary Progress Note        NAME: Renna Councilman Dejohnette - ROOM: 802/647-J - MRN: [id number] - Age: 53year old - : 1970        Last 24hrs: No events overnight, states that she feels about the same    OBJECTIVE:   10/03/21  1949 10/03/21  2013 10/ toilet at home. 4. Pneumonia with concern for gram-negative organisms  -CT consistent with increased lower lobe infiltrates  -continue vancomycin and Zosyn  5.  Possible right hilar mass  -CT chest with consolidation/mass in the RUL which is new but may be

## 2021-10-04 NOTE — CM/SW NOTE
Pt stating she needs portable tank. Pt did not remember the name of her company. Pt and son looked through wallet w/social work. Pt current w/[facility]. SW called this agency and spoke w/Luna who stated they are sister companies with HME.  They

## 2021-10-05 NOTE — PAYOR COMM NOTE
Payor:  Tyrees Levy #:  [id number]  Authorization Number: [id number]    Admit date: 9/29/21  Admit time:  10:23 AM  Discharge Date: 10/4/2021  5:05 PM

## 2021-10-05 NOTE — DISCHARGE SUMMARY
[facility] HOSPITALIST  DISCHARGE SUMMARY     Sharmaine Gurrola Patient Status:  Inpatient    1970 MRN [id number]   [facility] 4NW-A Attending No att. providers found   [facility] Day # 5 PCP Syeda Rubio MD     Date of Admission:  recommendations (brief descriptions):  • None    Lab/Test results pending at Discharge:   · None    Consultants:  • Pulm, Onc    Discharge Medication List:     Discharge Medications      START taking these medications      Instructions Prescription details nebulizer solution.    Quantity: 1 each  Refills: 0     ondansetron 8 MG tablet  Commonly known as: ZOFRAN      TAKE 1 TABLET(8 MG) BY MOUTH EVERY 12 HOURS AS NEEDED FOR NAUSEA   Quantity: 30 tablet  Refills: 1     ondansetron 8 MG Tbdp  Commonly known as: week  for follow up    Appointments for Next 30 Days 10/5/2021 - 11/4/2021            None          Vital signs:  Pulse:  [103] 103  Resp:  [16] 16  BP: (115)/(67) 115/67    Physical Exam:    General: No acute distress.    Respiratory: Clear to auscultation

## 2021-10-19 DIAGNOSIS — F11.20 OPIOID DEPENDENCE WITH CURRENT USE (HCC): ICD-10-CM

## 2021-10-20 NOTE — TELEPHONE ENCOUNTER
Requesting Ondanestron 8mg  LOV: 7/10/21  RTC: prn  Last Relevant Labs: 6/24/21  Filled: 5/20/21 #60 with 0 refills    Future Appointments   Date Time Provider Gina Trimble   10/28/2021 10:40 AM MD STEFAN Guillen KYREE     Rx pended

## 2021-10-21 RX ORDER — ONDANSETRON HYDROCHLORIDE 8 MG/1
TABLET, FILM COATED ORAL
Qty: 30 TABLET | Refills: 1 | Status: SHIPPED | OUTPATIENT
Start: 2021-10-21 | End: 2022-01-03

## 2022-01-02 DIAGNOSIS — J44.9 ASTHMA-COPD OVERLAP SYNDROME (HCC): ICD-10-CM

## 2022-01-02 DIAGNOSIS — J44.9 CHRONIC OBSTRUCTIVE PULMONARY DISEASE, UNSPECIFIED COPD TYPE (HCC): ICD-10-CM

## 2022-01-02 DIAGNOSIS — F11.20 OPIOID DEPENDENCE WITH CURRENT USE (HCC): ICD-10-CM

## 2022-01-03 RX ORDER — ALBUTEROL SULFATE 90 UG/1
AEROSOL, METERED RESPIRATORY (INHALATION)
Qty: 8.5 G | Refills: 0 | Status: SHIPPED | OUTPATIENT
Start: 2022-01-03

## 2022-01-03 RX ORDER — ONDANSETRON HYDROCHLORIDE 8 MG/1
TABLET, FILM COATED ORAL
Qty: 30 TABLET | Refills: 1 | Status: SHIPPED | OUTPATIENT
Start: 2022-01-03

## 2022-03-11 RX ORDER — ONDANSETRON HYDROCHLORIDE 8 MG/1
TABLET, FILM COATED ORAL
Qty: 30 TABLET | Refills: 1 | Status: SHIPPED | OUTPATIENT
Start: 2022-03-11

## 2022-05-10 RX ORDER — ESCITALOPRAM OXALATE 20 MG/1
TABLET ORAL
Qty: 90 TABLET | Refills: 3 | OUTPATIENT
Start: 2022-05-10

## 2022-05-10 NOTE — TELEPHONE ENCOUNTER
Requesting Escitalopram 20mg  LOV: 7/10/21  RTC: prn  Last Relevant Labs: 6/24/21  Filled: 5/20/21 #90 with 3 refills    No future appointments. Pt has new PCP listed.  Rx denied

## 2022-05-29 DIAGNOSIS — F11.20 OPIOID DEPENDENCE WITH CURRENT USE (HCC): ICD-10-CM

## 2022-06-01 RX ORDER — ONDANSETRON HYDROCHLORIDE 8 MG/1
TABLET, FILM COATED ORAL
Qty: 30 TABLET | Refills: 1 | OUTPATIENT
Start: 2022-06-01

## 2022-06-01 NOTE — TELEPHONE ENCOUNTER
Requesting Zofran 8mg  LOV: 7/10/21  RTC: not noted  Last Relevant Labs: 6/24/21  Filled: 3/11/22 #30 with 1 refills    No future appointments. Pt has new PCP listed. Rx denied.

## 2022-06-02 DIAGNOSIS — F11.20 OPIOID DEPENDENCE WITH CURRENT USE (HCC): ICD-10-CM

## 2022-06-06 RX ORDER — ONDANSETRON HYDROCHLORIDE 8 MG/1
TABLET, FILM COATED ORAL
Qty: 30 TABLET | Refills: 1 | OUTPATIENT
Start: 2022-06-06

## 2022-06-13 DIAGNOSIS — J44.9 CHRONIC OBSTRUCTIVE PULMONARY DISEASE, UNSPECIFIED COPD TYPE (HCC): ICD-10-CM

## 2022-06-13 DIAGNOSIS — F11.20 OPIOID DEPENDENCE WITH CURRENT USE (HCC): ICD-10-CM

## 2022-06-13 DIAGNOSIS — J44.9 ASTHMA-COPD OVERLAP SYNDROME (HCC): ICD-10-CM

## 2022-06-16 NOTE — TELEPHONE ENCOUNTER
Spoke to patient who states that she will schedule an appt with Dr. Jac Miller does not have different PCP. Pt will call back with new insurance information prior to scheduling an appt.

## 2022-06-18 RX ORDER — ALBUTEROL SULFATE 90 UG/1
AEROSOL, METERED RESPIRATORY (INHALATION)
Qty: 8.5 G | Refills: 0 | Status: SHIPPED | OUTPATIENT
Start: 2022-06-18

## 2022-06-18 RX ORDER — ONDANSETRON HYDROCHLORIDE 8 MG/1
TABLET, FILM COATED ORAL
Qty: 30 TABLET | Refills: 1 | Status: SHIPPED | OUTPATIENT
Start: 2022-06-18

## 2022-06-18 NOTE — TELEPHONE ENCOUNTER
Per , cancel refills that were sent to the pharmacy.  will speak with clinical supervisor on patients status. Pharmacy contacted and cancelled scripts.

## 2022-08-08 DIAGNOSIS — J44.9 ASTHMA-COPD OVERLAP SYNDROME (HCC): ICD-10-CM

## 2022-08-08 DIAGNOSIS — J44.9 CHRONIC OBSTRUCTIVE PULMONARY DISEASE, UNSPECIFIED COPD TYPE (HCC): ICD-10-CM

## 2022-08-08 DIAGNOSIS — F11.20 OPIOID DEPENDENCE WITH CURRENT USE (HCC): ICD-10-CM

## 2022-08-09 RX ORDER — TIOTROPIUM BROMIDE 18 UG/1
CAPSULE ORAL; RESPIRATORY (INHALATION)
Qty: 30 CAPSULE | Refills: 11 | OUTPATIENT
Start: 2022-08-09

## 2022-08-09 RX ORDER — ALBUTEROL SULFATE 90 UG/1
AEROSOL, METERED RESPIRATORY (INHALATION)
Qty: 8.5 G | Refills: 0 | OUTPATIENT
Start: 2022-08-09

## 2022-08-09 RX ORDER — ONDANSETRON HYDROCHLORIDE 8 MG/1
TABLET, FILM COATED ORAL
Qty: 30 TABLET | Refills: 1 | OUTPATIENT
Start: 2022-08-09

## 2022-09-29 DIAGNOSIS — J44.9 CHRONIC OBSTRUCTIVE PULMONARY DISEASE, UNSPECIFIED COPD TYPE (HCC): ICD-10-CM

## 2022-09-29 DIAGNOSIS — J44.9 ASTHMA-COPD OVERLAP SYNDROME (HCC): ICD-10-CM

## 2022-09-30 RX ORDER — MOMETASONE FUROATE AND FORMOTEROL FUMARATE DIHYDRATE 200; 5 UG/1; UG/1
AEROSOL RESPIRATORY (INHALATION)
Qty: 13 G | Refills: 0 | OUTPATIENT
Start: 2022-09-30

## 2022-11-14 DIAGNOSIS — J44.9 ASTHMA-COPD OVERLAP SYNDROME (HCC): ICD-10-CM

## 2022-11-21 RX ORDER — TIOTROPIUM BROMIDE 18 UG/1
CAPSULE ORAL; RESPIRATORY (INHALATION)
Qty: 30 CAPSULE | Refills: 11 | OUTPATIENT
Start: 2022-11-21

## 2022-11-21 NOTE — TELEPHONE ENCOUNTER
Request denied - pt has new PCP listed and last OV with Dr. Pradip Garrett was 7/10/21. Pt also seems pulmonology.

## 2023-02-27 DIAGNOSIS — J44.9 CHRONIC OBSTRUCTIVE PULMONARY DISEASE, UNSPECIFIED COPD TYPE (HCC): ICD-10-CM

## 2023-02-27 DIAGNOSIS — J44.9 ASTHMA-COPD OVERLAP SYNDROME (HCC): ICD-10-CM

## 2023-02-28 RX ORDER — MONTELUKAST SODIUM 10 MG/1
TABLET ORAL
Qty: 90 TABLET | Refills: 0 | OUTPATIENT
Start: 2023-02-28

## 2023-09-07 DIAGNOSIS — J44.9 CHRONIC OBSTRUCTIVE PULMONARY DISEASE, UNSPECIFIED COPD TYPE (HCC): ICD-10-CM

## 2023-09-07 DIAGNOSIS — J44.9 ASTHMA-COPD OVERLAP SYNDROME (HCC): ICD-10-CM

## 2023-09-11 RX ORDER — MONTELUKAST SODIUM 10 MG/1
10 TABLET ORAL NIGHTLY
Qty: 90 TABLET | Refills: 0 | OUTPATIENT
Start: 2023-09-11

## 2023-09-11 NOTE — TELEPHONE ENCOUNTER
Requested Renewals     Name from pharmacy: MONTELUKAST 10MG TABLETS         Will file in chart as: MONTELUKAST 10 MG Oral Tab    Sig: TAKE 1 TABLET(10 MG) BY MOUTH EVERY NIGHT    Disp: 90 tablet    Refills: 0 (Pharmacy requested: Not specified)    Start: 9/7/2023    Class: Normal    Non-formulary For: Chronic obstructive pulmonary disease, unspecified COPD type ([facility] [address] Asthma-COPD overlap syndrome ([facility] [address]    Last ordered: 2 years ago by Jacob Miguel DO Last refill: 5/20/2021    Rx #: [id number]    Asthma & COPD Medication Protocol [id number]/2023 10:46 PM    Asthma Action Score greater than or equal to 20    Appointment in past 6 or next 3 months    AAP/ACT given in last 12 months             No future appointments. LOV: 7/10/21  Last physical exam done 5/20/21  RX denied.

## (undated) DIAGNOSIS — F33.1 MODERATE EPISODE OF RECURRENT MAJOR DEPRESSIVE DISORDER (HCC): ICD-10-CM

## (undated) DIAGNOSIS — F11.20 OPIOID DEPENDENCE WITH CURRENT USE (HCC): ICD-10-CM

## (undated) NOTE — ED AVS SNAPSHOT
Elie Garcia   MRN: [id number]    Department:  [facility] in [address] [place] [address]   Date of Visit:  1/9/2020           Disclosure     Insurance plans vary and the physician(s) referred by the ER may not be covered by your plan.  Please cont tell this physician (or your personal doctor if your instructions are to return to your personal doctor) about any new or lasting problems. The primary care or specialist physician will see patients referred from the [facility] Emergency Department.  Александр Ruggiero